# Patient Record
Sex: MALE | Race: WHITE | NOT HISPANIC OR LATINO
[De-identification: names, ages, dates, MRNs, and addresses within clinical notes are randomized per-mention and may not be internally consistent; named-entity substitution may affect disease eponyms.]

---

## 2017-01-30 ENCOUNTER — APPOINTMENT (OUTPATIENT)
Dept: UROLOGY | Facility: CLINIC | Age: 62
End: 2017-01-30

## 2017-01-30 VITALS — HEART RATE: 78 BPM | DIASTOLIC BLOOD PRESSURE: 80 MMHG | OXYGEN SATURATION: 98 % | SYSTOLIC BLOOD PRESSURE: 138 MMHG

## 2017-02-01 LAB
APPEARANCE: CLEAR
BACTERIA: NEGATIVE
BILIRUBIN URINE: NEGATIVE
BLOOD URINE: NEGATIVE
COLOR: ABNORMAL
GLUCOSE QUALITATIVE U: NORMAL MG/DL
HYALINE CASTS: 2 /LPF
KETONES URINE: NEGATIVE
LEUKOCYTE ESTERASE URINE: NEGATIVE
MICROSCOPIC-UA: NORMAL
NITRITE URINE: NEGATIVE
PH URINE: 5.5
PROTEIN URINE: ABNORMAL MG/DL
RED BLOOD CELLS URINE: 5 /HPF
SPECIFIC GRAVITY URINE: 1.02
SQUAMOUS EPITHELIAL CELLS: 0 /HPF
UROBILINOGEN URINE: NORMAL MG/DL
WHITE BLOOD CELLS URINE: 3 /HPF

## 2020-02-17 ENCOUNTER — EMERGENCY (EMERGENCY)
Facility: HOSPITAL | Age: 65
LOS: 1 days | Discharge: HOME | End: 2020-02-17
Attending: EMERGENCY MEDICINE
Payer: COMMERCIAL

## 2020-02-17 VITALS
DIASTOLIC BLOOD PRESSURE: 91 MMHG | WEIGHT: 214.95 LBS | RESPIRATION RATE: 18 BRPM | HEART RATE: 100 BPM | OXYGEN SATURATION: 98 % | HEIGHT: 73 IN | SYSTOLIC BLOOD PRESSURE: 125 MMHG | TEMPERATURE: 99 F

## 2020-02-17 VITALS
RESPIRATION RATE: 16 BRPM | HEART RATE: 98 BPM | SYSTOLIC BLOOD PRESSURE: 137 MMHG | OXYGEN SATURATION: 100 % | DIASTOLIC BLOOD PRESSURE: 80 MMHG | TEMPERATURE: 98 F

## 2020-02-17 DIAGNOSIS — Z90.49 ACQUIRED ABSENCE OF OTHER SPECIFIED PARTS OF DIGESTIVE TRACT: Chronic | ICD-10-CM

## 2020-02-17 DIAGNOSIS — Z98.890 OTHER SPECIFIED POSTPROCEDURAL STATES: Chronic | ICD-10-CM

## 2020-02-17 LAB
ALBUMIN SERPL ELPH-MCNC: 4.2 G/DL — SIGNIFICANT CHANGE UP (ref 3.3–5)
ALP SERPL-CCNC: 84 U/L — SIGNIFICANT CHANGE UP (ref 40–120)
ALT FLD-CCNC: 15 U/L — SIGNIFICANT CHANGE UP (ref 10–45)
ANION GAP SERPL CALC-SCNC: 14 MMOL/L — SIGNIFICANT CHANGE UP (ref 5–17)
APPEARANCE UR: CLEAR — SIGNIFICANT CHANGE UP
AST SERPL-CCNC: 27 U/L — SIGNIFICANT CHANGE UP (ref 10–40)
BACTERIA # UR AUTO: NEGATIVE — SIGNIFICANT CHANGE UP
BILIRUB SERPL-MCNC: 1.6 MG/DL — HIGH (ref 0.2–1.2)
BILIRUB UR-MCNC: NEGATIVE — SIGNIFICANT CHANGE UP
BUN SERPL-MCNC: 11 MG/DL — SIGNIFICANT CHANGE UP (ref 7–23)
CALCIUM SERPL-MCNC: 9.6 MG/DL — SIGNIFICANT CHANGE UP (ref 8.4–10.5)
CHLORIDE SERPL-SCNC: 98 MMOL/L — SIGNIFICANT CHANGE UP (ref 96–108)
CO2 SERPL-SCNC: 24 MMOL/L — SIGNIFICANT CHANGE UP (ref 22–31)
COLOR SPEC: YELLOW — SIGNIFICANT CHANGE UP
CREAT SERPL-MCNC: 1.29 MG/DL — SIGNIFICANT CHANGE UP (ref 0.5–1.3)
DIFF PNL FLD: NEGATIVE — SIGNIFICANT CHANGE UP
EPI CELLS # UR: 0 /HPF — SIGNIFICANT CHANGE UP
GLUCOSE SERPL-MCNC: 115 MG/DL — HIGH (ref 70–99)
GLUCOSE UR QL: NEGATIVE — SIGNIFICANT CHANGE UP
HCT VFR BLD CALC: 48.1 % — SIGNIFICANT CHANGE UP (ref 39–50)
HGB BLD-MCNC: 16 G/DL — SIGNIFICANT CHANGE UP (ref 13–17)
HYALINE CASTS # UR AUTO: 1 /LPF — SIGNIFICANT CHANGE UP (ref 0–2)
KETONES UR-MCNC: NEGATIVE — SIGNIFICANT CHANGE UP
LEUKOCYTE ESTERASE UR-ACNC: NEGATIVE — SIGNIFICANT CHANGE UP
LIDOCAIN IGE QN: 26 U/L — SIGNIFICANT CHANGE UP (ref 7–60)
MCHC RBC-ENTMCNC: 31.4 PG — SIGNIFICANT CHANGE UP (ref 27–34)
MCHC RBC-ENTMCNC: 33.3 GM/DL — SIGNIFICANT CHANGE UP (ref 32–36)
MCV RBC AUTO: 94.5 FL — SIGNIFICANT CHANGE UP (ref 80–100)
NITRITE UR-MCNC: NEGATIVE — SIGNIFICANT CHANGE UP
NRBC # BLD: 0 /100 WBCS — SIGNIFICANT CHANGE UP (ref 0–0)
PH UR: 6 — SIGNIFICANT CHANGE UP (ref 5–8)
PLATELET # BLD AUTO: 176 K/UL — SIGNIFICANT CHANGE UP (ref 150–400)
POTASSIUM SERPL-MCNC: 3.5 MMOL/L — SIGNIFICANT CHANGE UP (ref 3.5–5.3)
POTASSIUM SERPL-SCNC: 3.5 MMOL/L — SIGNIFICANT CHANGE UP (ref 3.5–5.3)
PROT SERPL-MCNC: 7.8 G/DL — SIGNIFICANT CHANGE UP (ref 6–8.3)
PROT UR-MCNC: ABNORMAL
RBC # BLD: 5.09 M/UL — SIGNIFICANT CHANGE UP (ref 4.2–5.8)
RBC # FLD: 13 % — SIGNIFICANT CHANGE UP (ref 10.3–14.5)
RBC CASTS # UR COMP ASSIST: 3 /HPF — SIGNIFICANT CHANGE UP (ref 0–4)
SODIUM SERPL-SCNC: 136 MMOL/L — SIGNIFICANT CHANGE UP (ref 135–145)
SP GR SPEC: 1.02 — SIGNIFICANT CHANGE UP (ref 1.01–1.02)
UROBILINOGEN FLD QL: NEGATIVE — SIGNIFICANT CHANGE UP
WBC # BLD: 11.96 K/UL — HIGH (ref 3.8–10.5)
WBC # FLD AUTO: 11.96 K/UL — HIGH (ref 3.8–10.5)
WBC UR QL: 2 /HPF — SIGNIFICANT CHANGE UP (ref 0–5)

## 2020-02-17 PROCEDURE — 96375 TX/PRO/DX INJ NEW DRUG ADDON: CPT | Mod: XU

## 2020-02-17 PROCEDURE — 76770 US EXAM ABDO BACK WALL COMP: CPT

## 2020-02-17 PROCEDURE — 99284 EMERGENCY DEPT VISIT MOD MDM: CPT | Mod: 25

## 2020-02-17 PROCEDURE — 85027 COMPLETE CBC AUTOMATED: CPT

## 2020-02-17 PROCEDURE — 99284 EMERGENCY DEPT VISIT MOD MDM: CPT | Mod: GC

## 2020-02-17 PROCEDURE — 80053 COMPREHEN METABOLIC PANEL: CPT

## 2020-02-17 PROCEDURE — 87086 URINE CULTURE/COLONY COUNT: CPT

## 2020-02-17 PROCEDURE — 74177 CT ABD & PELVIS W/CONTRAST: CPT

## 2020-02-17 PROCEDURE — 96374 THER/PROPH/DIAG INJ IV PUSH: CPT | Mod: XU

## 2020-02-17 PROCEDURE — 83690 ASSAY OF LIPASE: CPT

## 2020-02-17 PROCEDURE — 74177 CT ABD & PELVIS W/CONTRAST: CPT | Mod: 26

## 2020-02-17 PROCEDURE — 81001 URINALYSIS AUTO W/SCOPE: CPT

## 2020-02-17 PROCEDURE — 76770 US EXAM ABDO BACK WALL COMP: CPT | Mod: 26

## 2020-02-17 RX ORDER — HYDROCORTISONE 20 MG
200 TABLET ORAL ONCE
Refills: 0 | Status: COMPLETED | OUTPATIENT
Start: 2020-02-17 | End: 2020-02-17

## 2020-02-17 RX ORDER — DIPHENHYDRAMINE HCL 50 MG
50 CAPSULE ORAL ONCE
Refills: 0 | Status: COMPLETED | OUTPATIENT
Start: 2020-02-17 | End: 2020-02-17

## 2020-02-17 RX ADMIN — Medication 50 MILLIGRAM(S): at 15:12

## 2020-02-17 RX ADMIN — Medication 1 TABLET(S): at 19:02

## 2020-02-17 RX ADMIN — Medication 200 MILLIGRAM(S): at 12:22

## 2020-02-17 NOTE — ED PROVIDER NOTE - CLINICAL SUMMARY MEDICAL DECISION MAKING FREE TEXT BOX
65 y/o M presenting with left inguinal/groin pain. Differential broad, considering likely MSK for nephrolithiasis. Lower concern for incarcerated hernia, diverticulitis. Will obtain basic labs, UA/UC. 65 y/o M presenting with left inguinal/groin pain. Differential broad, considering likely MSK for nephrolithiasis. Lower concern for incarcerated hernia, diverticulitis. Will obtain basic labs, UA/UC.      Attending note. Left lower quadrant pain which is intermittent for the last 2 days. Patient has no constitutional symptoms were  symptoms or GI symptoms. Basic labs, urinalysis and reassess. Ultrasound to rule out hydronephrosis as patient has a history of renal stones, although this does not feel like that according to the patient.

## 2020-02-17 NOTE — ED ADULT NURSE NOTE - OBJECTIVE STATEMENT
65 yo presents to the ED From home. A&OX4, ambulatory c/o LLQ pain. starting last night, shooting pain, intermittent, 4/10. did not take anything at home for pain relief. reports decreased appetite. denies n/v/d. denies fever, chills. denies urinary symptoms. history of kidney stone last year, on the left side. reports that this pain does not feel similar. abd soft nondistended nontender. no medical history. pt is well appearing. VSS. 20G inserted in RAC.

## 2020-02-17 NOTE — ED PROVIDER NOTE - PHYSICAL EXAMINATION
gen: well appearing  Mentation: AAO x 3  psych: mood appropriate  ENT: airway patent  Eyes: conjunctivae clear bilaterally  Cardio: RRR, no m/r/g  Resp: normal BS b/l  GI: LLQ TTP, no hernia appreciated; soft, nondistended  : no hernia appreciated, no CVA tenderness  Neuro: sensation and motor function intact  Skin: No evidence of rash  MSK: normal movement of all extremities  Lymph/Vasc: no LE edema gen: well appearing  Mentation: AAO x 3  psych: mood appropriate  ENT: airway patent  Eyes: conjunctivae clear bilaterally  Cardio: RRR, no m/r/g  Resp: normal BS b/l  GI: LLQ TTP, no hernia appreciated; soft, nondistended  : no hernia appreciated, no CVA tenderness  Neuro: sensation and motor function intact  Skin: No evidence of rash  MSK: normal movement of all extremities  Lymph/Vasc: no LE edema     Attending note. Patient is alert and in no acute distress. Patient currently has no pain unless he presses the left lower quadrant. There is no pallor jaundice. Lungs are clear and equal bilaterally. Heart is regular rate and rhythm. Abdomen is soft with mild tenderness in the left lower quadrant and flank. There is no CVA tenderness. There is no palpable hernia. Scrotum and testicles are normal. Skin is normal. There are no rashes or lesions on the abdomen or flank. Extremities are normal.  Her large examination is grossly intact.

## 2020-02-17 NOTE — ED ADULT NURSE REASSESSMENT NOTE - NS ED NURSE REASSESS COMMENT FT1
Pt AAOx4, NAD, resting comfortably in bed. Pt tolerated PO challenge. Pt denies headache, dizziness, chest pain, cough, SOB, abdominal pain, n/v/d, urinary symptoms, fevers, chills, weakness at this time. Pt discharged as per MD, IV removed as per MD, pt ambulated independently out of ED, steady gait noted.

## 2020-02-17 NOTE — ED PROCEDURE NOTE - PROCEDURE ADDITIONAL DETAILS
POCUS: Emergency Department Focused Ultrasound performed at patient's bedside.  The complete report may be available in PACS, see below for findings. POCUS: Emergency Department Focused Ultrasound performed at patient's bedside.  The complete report may be available in PACS, see below for findings.  Patient and treating team, Dr. Villanueva/Myesha informed of the finding of a complex renal cyst in the right kidney and of the need to Follow up with his primary medical doctor and urologist on the monitoring and maintenance of this finding.  The patient and/or family were informed of the result of this test and was encouraged to follow up on the findings with their doctor (as well as the need to inform their doctor of the results). The patient is aware of the need to follow up with repeat testing as applicable and reports understanding of the above with capacity and insight. POCUS: Emergency Department Focused Ultrasound performed at patient's bedside.  The complete report may be available in PACS, see below for findings.  Patient and treating team, Dr. Villanueva/Gustavo informed of the finding of a complex renal cyst in the right kidney and of the need to Follow up with his primary medical doctor and urologist on the monitoring and maintenance of this finding.  The patient and/or family were informed of the result of this test and was encouraged to follow up on the findings with their doctor (as well as the need to inform their doctor of the results). The patient is aware of the need to follow up with repeat testing as applicable and reports understanding of the above with capacity and insight.

## 2020-02-17 NOTE — ED PROVIDER NOTE - NSFOLLOWUPINSTRUCTIONS_ED_ALL_ED_FT
Diverticulitis    Diverticulitis is inflammation or infection of small pouches in your colon that form when you HAVE a condition called diverticulosis. This condition can range from mild to severe potentially leading to perforation or obstructions of your colon. Symptoms include abdominal pain, fever/chills, nausea, vomiting, diarrhea, constipation, or blood in your stool. If you were prescribed an antibiotic medicine, take it as told by your health care provider. Do not stop taking the antibiotic even if you start to feel better.    SEEK IMMEDIATE MEDICAL CARE IF YOU HAVE ANY OF THE FOLLOWING SYMPTOMS: worsening abdominal pain, high fever, inability to hold down liquids or medication, black or bloody stools, inability to pass gas, lightheadedness/dizziness, or a change in mental status.    PLEASE FOLLOW UP WITH YOUR PRIMARY CARE DOCTOR AND GASTROENTEROLOGY. SOMEONE FROM OUR ED REFERRAL TEAM WILL CONTACT YOU TO MAKE AN APPOINTMENT WITH GASTROENTEROLOGY.

## 2020-02-17 NOTE — ED PROVIDER NOTE - NS ED ROS FT
CONSTITUTIONAL: No fevers, no chills, no lightheadedness, no dizziness  Eyes: no visual changes  Ears: no ear drainage, no ear pain  Nose: no nasal congestion  Mouth/Throat: no sore throat  CV: No chest pain, no palpitations  PULM: No SOB, no cough  GI: No n/v/d, no abd pain  : no dysuria, no hematuria  SKIN: no rashes.  NEURO: no headache, no focal weakness or numbness  LYMPH/VASC: no LE swelling

## 2020-02-17 NOTE — ED PROVIDER NOTE - PATIENT PORTAL LINK FT
You can access the FollowMyHealth Patient Portal offered by [facility] by registering at the following website: [email or web address]. By joining [facility]’s FollowMyHealth portal, you will also be able to view your health information using other applications (apps) compatible with our system.

## 2020-02-17 NOTE — ED PROVIDER NOTE - OBJECTIVE STATEMENT
63 y/o M with PMH of kidney stones presenting with 2 days of left groin pain. Patient states he noticed pain after getting up from a chair and walking. Patient states pain only comes on if he touches the area or moves in a certain way. Denies urinary complains, fevers, cp, sob, n/v/d. States pain is different from kidney stone in past. 65 y/o M with PMH of kidney stones presenting with 2 days of left groin pain. Patient states he noticed pain after getting up from a chair and walking. Patient states pain only comes on if he touches the area or moves in a certain way. Denies urinary complains, fevers, cp, sob, n/v/d. States pain is different from kidney stone in past.       Attending note. Patient was seen in room #30. Agree with the above. Patient is 2 days of intermittent left lower quadrant and flank pain. He denies any fevers, chills or sweats. He has no nausea or vomiting. He denies any hematuria, or urgency or frequency. Denies any pain radiating to the penis were testicles. He has a history of renal stone in December of 2016. He reports this pain feels different. He has no change in his bowels. Pain is only fair when he presses the area.

## 2020-02-18 LAB
CULTURE RESULTS: SIGNIFICANT CHANGE UP
SPECIMEN SOURCE: SIGNIFICANT CHANGE UP

## 2022-12-31 ENCOUNTER — NON-APPOINTMENT (OUTPATIENT)
Age: 67
End: 2022-12-31

## 2023-01-05 ENCOUNTER — INPATIENT (INPATIENT)
Facility: HOSPITAL | Age: 68
LOS: 2 days | Discharge: LEFT BEFORE DISCHARGE | End: 2023-01-08
Attending: INTERNAL MEDICINE | Admitting: INTERNAL MEDICINE
Payer: COMMERCIAL

## 2023-01-05 VITALS
SYSTOLIC BLOOD PRESSURE: 135 MMHG | HEART RATE: 67 BPM | OXYGEN SATURATION: 100 % | RESPIRATION RATE: 14 BRPM | DIASTOLIC BLOOD PRESSURE: 76 MMHG | TEMPERATURE: 98 F

## 2023-01-05 DIAGNOSIS — Z90.49 ACQUIRED ABSENCE OF OTHER SPECIFIED PARTS OF DIGESTIVE TRACT: Chronic | ICD-10-CM

## 2023-01-05 DIAGNOSIS — Z98.890 OTHER SPECIFIED POSTPROCEDURAL STATES: Chronic | ICD-10-CM

## 2023-01-05 DIAGNOSIS — R07.9 CHEST PAIN, UNSPECIFIED: ICD-10-CM

## 2023-01-05 PROBLEM — Z78.9 OTHER SPECIFIED HEALTH STATUS: Chronic | Status: ACTIVE | Noted: 2020-02-17

## 2023-01-05 LAB
ALBUMIN SERPL ELPH-MCNC: 4.6 G/DL — SIGNIFICANT CHANGE UP (ref 3.3–5)
ALP SERPL-CCNC: 88 U/L — SIGNIFICANT CHANGE UP (ref 40–120)
ALT FLD-CCNC: 26 U/L — SIGNIFICANT CHANGE UP (ref 4–41)
ANION GAP SERPL CALC-SCNC: 17 MMOL/L — HIGH (ref 7–14)
ANION GAP SERPL CALC-SCNC: 9 MMOL/L — SIGNIFICANT CHANGE UP (ref 7–14)
APTT BLD: 30.7 SEC — SIGNIFICANT CHANGE UP (ref 27–36.3)
AST SERPL-CCNC: 31 U/L — SIGNIFICANT CHANGE UP (ref 4–40)
B PERT DNA SPEC QL NAA+PROBE: SIGNIFICANT CHANGE UP
B PERT+PARAPERT DNA PNL SPEC NAA+PROBE: SIGNIFICANT CHANGE UP
BASE EXCESS BLDV CALC-SCNC: -0.5 MMOL/L — SIGNIFICANT CHANGE UP (ref -2–3)
BASE EXCESS BLDV CALC-SCNC: 5.5 MMOL/L — HIGH (ref -2–3)
BASOPHILS # BLD AUTO: 0.02 K/UL — SIGNIFICANT CHANGE UP (ref 0–0.2)
BASOPHILS NFR BLD AUTO: 0.3 % — SIGNIFICANT CHANGE UP (ref 0–2)
BILIRUB SERPL-MCNC: 1.1 MG/DL — SIGNIFICANT CHANGE UP (ref 0.2–1.2)
BLOOD GAS VENOUS COMPREHENSIVE RESULT: SIGNIFICANT CHANGE UP
BORDETELLA PARAPERTUSSIS (RAPRVP): SIGNIFICANT CHANGE UP
BUN SERPL-MCNC: 10 MG/DL — SIGNIFICANT CHANGE UP (ref 7–23)
BUN SERPL-MCNC: 11 MG/DL — SIGNIFICANT CHANGE UP (ref 7–23)
C PNEUM DNA SPEC QL NAA+PROBE: SIGNIFICANT CHANGE UP
CALCIUM SERPL-MCNC: 10.7 MG/DL — HIGH (ref 8.4–10.5)
CALCIUM SERPL-MCNC: 9.3 MG/DL — SIGNIFICANT CHANGE UP (ref 8.4–10.5)
CHLORIDE BLDV-SCNC: 104 MMOL/L — SIGNIFICANT CHANGE UP (ref 96–108)
CHLORIDE BLDV-SCNC: 105 MMOL/L — SIGNIFICANT CHANGE UP (ref 96–108)
CHLORIDE BLDV-SCNC: 106 MMOL/L — SIGNIFICANT CHANGE UP (ref 96–108)
CHLORIDE BLDV-SCNC: 106 MMOL/L — SIGNIFICANT CHANGE UP (ref 96–108)
CHLORIDE SERPL-SCNC: 104 MMOL/L — SIGNIFICANT CHANGE UP (ref 98–107)
CHLORIDE SERPL-SCNC: 106 MMOL/L — SIGNIFICANT CHANGE UP (ref 98–107)
CO2 BLDV-SCNC: 31.1 MMOL/L — HIGH (ref 22–26)
CO2 BLDV-SCNC: 31.2 MMOL/L — HIGH (ref 22–26)
CO2 BLDV-SCNC: 32 MMOL/L — HIGH (ref 22–26)
CO2 BLDV-SCNC: 32 MMOL/L — HIGH (ref 22–26)
CO2 SERPL-SCNC: 27 MMOL/L — SIGNIFICANT CHANGE UP (ref 22–31)
CO2 SERPL-SCNC: 28 MMOL/L — SIGNIFICANT CHANGE UP (ref 22–31)
CREAT SERPL-MCNC: 1.15 MG/DL — SIGNIFICANT CHANGE UP (ref 0.5–1.3)
CREAT SERPL-MCNC: 1.15 MG/DL — SIGNIFICANT CHANGE UP (ref 0.5–1.3)
EGFR: 69 ML/MIN/1.73M2 — SIGNIFICANT CHANGE UP
EGFR: 69 ML/MIN/1.73M2 — SIGNIFICANT CHANGE UP
EOSINOPHIL # BLD AUTO: 0.24 K/UL — SIGNIFICANT CHANGE UP (ref 0–0.5)
EOSINOPHIL NFR BLD AUTO: 3.5 % — SIGNIFICANT CHANGE UP (ref 0–6)
FLUAV SUBTYP SPEC NAA+PROBE: SIGNIFICANT CHANGE UP
FLUBV RNA SPEC QL NAA+PROBE: SIGNIFICANT CHANGE UP
GAS PNL BLDV: 137 MMOL/L — SIGNIFICANT CHANGE UP (ref 136–145)
GAS PNL BLDV: 139 MMOL/L — SIGNIFICANT CHANGE UP (ref 136–145)
GAS PNL BLDV: 139 MMOL/L — SIGNIFICANT CHANGE UP (ref 136–145)
GAS PNL BLDV: 142 MMOL/L — SIGNIFICANT CHANGE UP (ref 136–145)
GLUCOSE BLDV-MCNC: 87 MG/DL — SIGNIFICANT CHANGE UP (ref 70–99)
GLUCOSE BLDV-MCNC: 92 MG/DL — SIGNIFICANT CHANGE UP (ref 70–99)
GLUCOSE BLDV-MCNC: 93 MG/DL — SIGNIFICANT CHANGE UP (ref 70–99)
GLUCOSE BLDV-MCNC: 94 MG/DL — SIGNIFICANT CHANGE UP (ref 70–99)
GLUCOSE SERPL-MCNC: 102 MG/DL — HIGH (ref 70–99)
GLUCOSE SERPL-MCNC: 96 MG/DL — SIGNIFICANT CHANGE UP (ref 70–99)
HADV DNA SPEC QL NAA+PROBE: SIGNIFICANT CHANGE UP
HCO3 BLDV-SCNC: 29 MMOL/L — SIGNIFICANT CHANGE UP (ref 22–29)
HCO3 BLDV-SCNC: 30 MMOL/L — HIGH (ref 22–29)
HCO3 BLDV-SCNC: 31 MMOL/L — HIGH (ref 22–29)
HCO3 BLDV-SCNC: 31 MMOL/L — HIGH (ref 22–29)
HCOV 229E RNA SPEC QL NAA+PROBE: SIGNIFICANT CHANGE UP
HCOV HKU1 RNA SPEC QL NAA+PROBE: SIGNIFICANT CHANGE UP
HCOV NL63 RNA SPEC QL NAA+PROBE: SIGNIFICANT CHANGE UP
HCOV OC43 RNA SPEC QL NAA+PROBE: SIGNIFICANT CHANGE UP
HCT VFR BLD CALC: 49.4 % — SIGNIFICANT CHANGE UP (ref 39–50)
HCT VFR BLDA CALC: 45 % — SIGNIFICANT CHANGE UP (ref 39–51)
HCT VFR BLDA CALC: 47 % — SIGNIFICANT CHANGE UP (ref 39–51)
HCT VFR BLDA CALC: 47 % — SIGNIFICANT CHANGE UP (ref 39–51)
HCT VFR BLDA CALC: 49 % — SIGNIFICANT CHANGE UP (ref 39–51)
HGB BLD CALC-MCNC: 14.9 G/DL — SIGNIFICANT CHANGE UP (ref 13–17)
HGB BLD CALC-MCNC: 15.5 G/DL — SIGNIFICANT CHANGE UP (ref 13–17)
HGB BLD CALC-MCNC: 15.6 G/DL — SIGNIFICANT CHANGE UP (ref 13–17)
HGB BLD CALC-MCNC: 16.3 G/DL — SIGNIFICANT CHANGE UP (ref 13–17)
HGB BLD-MCNC: 15.9 G/DL — SIGNIFICANT CHANGE UP (ref 13–17)
HMPV RNA SPEC QL NAA+PROBE: SIGNIFICANT CHANGE UP
HPIV1 RNA SPEC QL NAA+PROBE: SIGNIFICANT CHANGE UP
HPIV2 RNA SPEC QL NAA+PROBE: SIGNIFICANT CHANGE UP
HPIV3 RNA SPEC QL NAA+PROBE: SIGNIFICANT CHANGE UP
HPIV4 RNA SPEC QL NAA+PROBE: SIGNIFICANT CHANGE UP
IANC: 3.9 K/UL — SIGNIFICANT CHANGE UP (ref 1.8–7.4)
IMM GRANULOCYTES NFR BLD AUTO: 0.1 % — SIGNIFICANT CHANGE UP (ref 0–0.9)
INR BLD: 1.04 RATIO — SIGNIFICANT CHANGE UP (ref 0.88–1.16)
LACTATE BLDV-MCNC: 0.8 MMOL/L — SIGNIFICANT CHANGE UP (ref 0.5–2)
LACTATE BLDV-MCNC: 0.9 MMOL/L — SIGNIFICANT CHANGE UP (ref 0.5–2)
LACTATE BLDV-MCNC: 1.1 MMOL/L — SIGNIFICANT CHANGE UP (ref 0.5–2)
LACTATE BLDV-MCNC: 7.1 MMOL/L — CRITICAL HIGH (ref 0.5–2)
LIDOCAIN IGE QN: 38 U/L — SIGNIFICANT CHANGE UP (ref 7–60)
LYMPHOCYTES # BLD AUTO: 1.93 K/UL — SIGNIFICANT CHANGE UP (ref 1–3.3)
LYMPHOCYTES # BLD AUTO: 28.1 % — SIGNIFICANT CHANGE UP (ref 13–44)
M PNEUMO DNA SPEC QL NAA+PROBE: SIGNIFICANT CHANGE UP
MAGNESIUM SERPL-MCNC: 2.1 MG/DL — SIGNIFICANT CHANGE UP (ref 1.6–2.6)
MAGNESIUM SERPL-MCNC: 2.3 MG/DL — SIGNIFICANT CHANGE UP (ref 1.6–2.6)
MCHC RBC-ENTMCNC: 30.8 PG — SIGNIFICANT CHANGE UP (ref 27–34)
MCHC RBC-ENTMCNC: 32.2 GM/DL — SIGNIFICANT CHANGE UP (ref 32–36)
MCV RBC AUTO: 95.6 FL — SIGNIFICANT CHANGE UP (ref 80–100)
MONOCYTES # BLD AUTO: 0.76 K/UL — SIGNIFICANT CHANGE UP (ref 0–0.9)
MONOCYTES NFR BLD AUTO: 11.1 % — SIGNIFICANT CHANGE UP (ref 2–14)
NEUTROPHILS # BLD AUTO: 3.9 K/UL — SIGNIFICANT CHANGE UP (ref 1.8–7.4)
NEUTROPHILS NFR BLD AUTO: 56.9 % — SIGNIFICANT CHANGE UP (ref 43–77)
NRBC # BLD: 0 /100 WBCS — SIGNIFICANT CHANGE UP (ref 0–0)
NRBC # FLD: 0 K/UL — SIGNIFICANT CHANGE UP (ref 0–0)
NT-PROBNP SERPL-SCNC: 44 PG/ML — SIGNIFICANT CHANGE UP
PCO2 BLDV: 41 MMHG — LOW (ref 42–55)
PCO2 BLDV: 45 MMHG — SIGNIFICANT CHANGE UP (ref 42–55)
PCO2 BLDV: 45 MMHG — SIGNIFICANT CHANGE UP (ref 42–55)
PCO2 BLDV: 68 MMHG — HIGH (ref 42–55)
PH BLDV: 7.24 — LOW (ref 7.32–7.43)
PH BLDV: 7.44 — HIGH (ref 7.32–7.43)
PH BLDV: 7.44 — HIGH (ref 7.32–7.43)
PH BLDV: 7.47 — HIGH (ref 7.32–7.43)
PHOSPHATE SERPL-MCNC: 2.6 MG/DL — SIGNIFICANT CHANGE UP (ref 2.5–4.5)
PLATELET # BLD AUTO: 237 K/UL — SIGNIFICANT CHANGE UP (ref 150–400)
PO2 BLDV: 27 MMHG — SIGNIFICANT CHANGE UP
PO2 BLDV: 29 MMHG — SIGNIFICANT CHANGE UP
PO2 BLDV: 30 MMHG — SIGNIFICANT CHANGE UP
PO2 BLDV: 31 MMHG — SIGNIFICANT CHANGE UP
POTASSIUM BLDV-SCNC: 3.3 MMOL/L — LOW (ref 3.5–5.1)
POTASSIUM BLDV-SCNC: 3.4 MMOL/L — LOW (ref 3.5–5.1)
POTASSIUM BLDV-SCNC: 3.6 MMOL/L — SIGNIFICANT CHANGE UP (ref 3.5–5.1)
POTASSIUM BLDV-SCNC: 3.7 MMOL/L — SIGNIFICANT CHANGE UP (ref 3.5–5.1)
POTASSIUM SERPL-MCNC: 3.7 MMOL/L — SIGNIFICANT CHANGE UP (ref 3.5–5.3)
POTASSIUM SERPL-MCNC: 3.9 MMOL/L — SIGNIFICANT CHANGE UP (ref 3.5–5.3)
POTASSIUM SERPL-SCNC: 3.7 MMOL/L — SIGNIFICANT CHANGE UP (ref 3.5–5.3)
POTASSIUM SERPL-SCNC: 3.9 MMOL/L — SIGNIFICANT CHANGE UP (ref 3.5–5.3)
PROT SERPL-MCNC: 8 G/DL — SIGNIFICANT CHANGE UP (ref 6–8.3)
PROTHROM AB SERPL-ACNC: 12.1 SEC — SIGNIFICANT CHANGE UP (ref 10.5–13.4)
RAPID RVP RESULT: SIGNIFICANT CHANGE UP
RBC # BLD: 5.17 M/UL — SIGNIFICANT CHANGE UP (ref 4.2–5.8)
RBC # FLD: 13.1 % — SIGNIFICANT CHANGE UP (ref 10.3–14.5)
RSV RNA SPEC QL NAA+PROBE: SIGNIFICANT CHANGE UP
RV+EV RNA SPEC QL NAA+PROBE: SIGNIFICANT CHANGE UP
SAO2 % BLDV: 37.1 % — SIGNIFICANT CHANGE UP
SAO2 % BLDV: 44.6 % — SIGNIFICANT CHANGE UP
SAO2 % BLDV: 47.3 % — SIGNIFICANT CHANGE UP
SAO2 % BLDV: 51.3 % — SIGNIFICANT CHANGE UP
SARS-COV-2 RNA SPEC QL NAA+PROBE: SIGNIFICANT CHANGE UP
SODIUM SERPL-SCNC: 143 MMOL/L — SIGNIFICANT CHANGE UP (ref 135–145)
SODIUM SERPL-SCNC: 148 MMOL/L — HIGH (ref 135–145)
TROPONIN T, HIGH SENSITIVITY RESULT: 8 NG/L — SIGNIFICANT CHANGE UP
TROPONIN T, HIGH SENSITIVITY RESULT: 8 NG/L — SIGNIFICANT CHANGE UP
WBC # BLD: 6.86 K/UL — SIGNIFICANT CHANGE UP (ref 3.8–10.5)
WBC # FLD AUTO: 6.86 K/UL — SIGNIFICANT CHANGE UP (ref 3.8–10.5)

## 2023-01-05 PROCEDURE — 99285 EMERGENCY DEPT VISIT HI MDM: CPT | Mod: GC

## 2023-01-05 PROCEDURE — 99223 1ST HOSP IP/OBS HIGH 75: CPT

## 2023-01-05 PROCEDURE — 71045 X-RAY EXAM CHEST 1 VIEW: CPT | Mod: 26

## 2023-01-05 RX ORDER — SODIUM CHLORIDE 9 MG/ML
1000 INJECTION INTRAMUSCULAR; INTRAVENOUS; SUBCUTANEOUS ONCE
Refills: 0 | Status: DISCONTINUED | OUTPATIENT
Start: 2023-01-05 | End: 2023-01-05

## 2023-01-05 RX ORDER — SODIUM CHLORIDE 9 MG/ML
1000 INJECTION, SOLUTION INTRAVENOUS ONCE
Refills: 0 | Status: COMPLETED | OUTPATIENT
Start: 2023-01-05 | End: 2023-01-05

## 2023-01-05 RX ADMIN — SODIUM CHLORIDE 1000 MILLILITER(S): 9 INJECTION, SOLUTION INTRAVENOUS at 15:17

## 2023-01-05 NOTE — H&P ADULT - HISTORY OF PRESENT ILLNESS
68 yo man, former heavy smoker, with history of cholecystectomy and inguinal hernia repair presents with b/l chest pain and abdominal bloating for past 2 weeks. Pt states that just before [place], he started having a frequent productive cough, bringing up lots of white sputum. Soon after onset of the cough, pt began having b/l chest pain, localized to his lower anterior ribs. Pt describes the chest pain as a squeezing sensation, feeling like someone is  66 yo man, former heavy smoker, with history of cholecystectomy and inguinal hernia repair presents with b/l chest pain, decreased exercise tolerance, poor appetite/PO intake, and abdominal bloating for past 2 weeks. Pt states that just before [place], he started having a frequent productive cough, bringing up lots of white sputum. Soon after onset of the cough, pt began having b/l chest pain, localized to his lower anterior ribs. Pt describes the chest pain as a squeezing sensation, feeling like someone is pushing his rib cage in on both sides. Pt also notes that due to the copious amount of phlegm he was having, he lost his appetite, forgoing most meals over the 2-week period. Pt has lost about 10 lbs 68 yo man, former heavy smoker, with history of cholecystectomy and inguinal hernia repair presents with b/l chest pain, decreased exercise tolerance, poor appetite/PO intake, and abdominal bloating for past 2 weeks. Pt states that just before [place], he started having a frequent productive cough, bringing up lots of white sputum. Soon after onset of the cough, pt began having b/l chest pain, localized to his lower anterior ribs. Pt describes the chest pain as a squeezing sensation, feeling like someone is pushing his rib cage in on both sides. Pt also notes that due to the copious amount of phlegm he was having, he lost his appetite, forgoing most meals over the 2-week period. Pt has lost about 10 lbs during this time.  68 yo man, former heavy smoker, with history of cholecystectomy and inguinal hernia repair presents with b/l chest pain, decreased exercise tolerance, poor appetite/PO intake, and abdominal bloating for past 2 weeks. Pt states that just before [place], he started having a frequent productive cough, bringing up lots of white sputum. Soon after onset of the cough, pt began having b/l chest pain, localized to his lower anterior ribs. Pt describes the chest pain as a squeezing sensation, feeling like someone is pushing his rib cage in on both sides. Prior to 2 weeks ago, pt was walking 2 miles every morning without any issues, but over the past 2 weeks, pt has only been able to take his daily 2-mile walk twice, both times Pt also notes that due to the copious amount of phlegm he was having, he lost his appetite, forgoing most meals over the 2-week period. Pt has lost about 10 lbs during this time.  68 yo man, former heavy smoker, with history of cholecystectomy and inguinal hernia repair presents with b/l chest pain, decreased exercise tolerance, poor appetite/PO intake, and abdominal bloating for past 2 weeks. Pt states that just before [place], he started having a frequent productive cough, bringing up lots of white sputum. Soon after onset of the cough, pt began having b/l chest pain, localized to his lower anterior ribs. Pt describes the chest pain as a tightness/squeezing sensation, feeling like someone is pushing his rib cage in on both sides. Prior to 2 weeks ago, pt was walking 2 miles every morning without any issues, but over the past 2 weeks, pt has been able to take his daily 2-mile walk only twice, with pt feeling severe b/l chest tightness during his walk and exhaustion after returning home. Pt would also become easily fatigued, even occasionally short of breath, with routine activity at home. Pt also notes that due to the copious amount of phlegm he was having with the cough, he lost his appetite, forgoing most meals over the 2-week period. Pt has lost about 10 lbs during this time. Pt went to urgent care this past Sunday to get evaluated for his symptoms and had a negative COVID-19 test and CXR, with no medications prescribed. Pt reports that while his cough and b/l chest pain have been gradually improving since his urgent care visit, pt decided to go to [facility] ED because of abdominal bloating  66 yo man, former heavy smoker, with history of cholecystectomy and inguinal hernia repair presents with b/l chest pain, decreased exercise tolerance, poor appetite/PO intake, and abdominal bloating for past 2 weeks. Pt states that just before [place], he started having a frequent productive cough, bringing up lots of white sputum. Soon after onset of the cough, pt began having b/l chest pain, localized to his lower anterior ribs. Pt describes the chest pain as a tightness/squeezing sensation, feeling like someone is pushing his rib cage in on both sides. Prior to 2 weeks ago, pt was walking 2 miles every morning without any issues, but over the past 2 weeks, pt has been able to take his daily 2-mile walk only twice, with pt feeling severe b/l chest tightness during his walk and exhaustion after returning home. Pt would also become easily fatigued, even occasionally short of breath, with routine activity at home. Pt also notes that due to the copious amount of phlegm he was having with the cough, he lost his appetite, forgoing most meals over the 2-week period. Pt has lost about 10 lbs during this time. Pt went to urgent care this past Sunday to get evaluated for his symptoms and had a negative COVID-19 test and CXR, with no medications prescribed. Pt reports that while his cough and b/l chest pain have been gradually improving since his urgent care visit, pt has been having abdominal bloating at night, causing him to not be able to sleep for more than a couple hours at a time. Pt denies any orthopnea, PND, LE pain/swelling, nausea, or vomiting. Pt also denies any recent fevers, chills, palpitations, lightheadedness, dizziness, headaches, back pain, diarrhea, constipation, melena, BRBPR, or urinary symptoms.     Of note, pt mentions that 5 family members and 2 good friends passed away since September, so he is now feeling anxious about finding out why he is having his current symptoms. Pt denies any depressive symptoms or SI/HI.     In the ED,  T 98-98.9, HR 66-67, -137/76-91, RR 14-20, SpO2 100% RA.  Hs-trop 8 -> 8, pro-BNP 44.

## 2023-01-05 NOTE — H&P ADULT - TIME BILLING
I had a face to face encounter with this patient. I spent 75 total minutes on chart review, bedside interview and physical exam, orders, interpretation of results, documentation, and coordination of care for this patient.

## 2023-01-05 NOTE — ED ADULT NURSE REASSESSMENT NOTE - NS ED NURSE REASSESS COMMENT FT1
Break RN note- Patient resting quietly in bed, breathing even and nonlabored. No acute distress. Patient denies any chest pain or SOB at this time. Safety maintained. Cardiac monitor in place- sinus rhythm. Patient stable upon exiting the room.

## 2023-01-05 NOTE — ED PROVIDER NOTE - ATTENDING CONTRIBUTION TO CARE
Dr. Hanson: This is a 67-year-old male with no known past medical history, in the emergency department with reduced exercise tolerance for approximately 2 weeks.  Patient states that he is used to walking over 2 miles a day without any issues, recently started to feel like walking 2 miles made him very winded, more recently he has been unable to complete his walk due to feeling so short of breath.  He also feels at night when he lays flat that his abdomen is "tight", but he feels no shortness of breath when laying flat.  He also endorses that he has been eating less due to decreased appetite, and he thinks that he has lost approximately 9 pounds over the last 10 days.  He denies chest pain, nausea, vomiting, diarrhea.  On exam pt well appearing, in NAD, heart RRR, lungs CTAB, abd NTND, extremities without swelling, strength 5/5 in all extremities and skin without rash.     My DDx includes CHF vs. ACS vs. viral syndrome vs. pneumonia.    I reviewed external notes showing ___.    I received additional history from ___ (EMS, family, previous charts) which revealed ___.    Labs were ordered and independently reviewed and demonstrate ___.    Imaging was ordered and independently reviewed and demonstrates ___.    An EKG was ordered and independently reviewed and demonstrates ___.      Results and management discussed with ___ (radiology/cardiology/etc).    Medical care for this patient is impacted by ___ (SDOH with food/housing insecurity, inability to afford medications, substance misuse). Dr. Hanson: This is a 67-year-old male with no known past medical history, in the emergency department with reduced exercise tolerance for approximately 2 weeks.  Patient states that he is used to walking over 2 miles a day without any issues, recently started to feel like walking 2 miles made him very winded, more recently he has been unable to complete his walk due to feeling so short of breath.  He also feels at night when he lays flat that his abdomen is "tight", but he feels no shortness of breath when laying flat.  He also endorses that he has been eating less due to decreased appetite, and he thinks that he has lost approximately 9 pounds over the last 10 days.  He denies chest pain, nausea, vomiting, diarrhea.  On exam pt well appearing, in NAD, heart RRR, lungs CTAB, abd NTND, extremities without swelling, strength 5/5 in all extremities and skin without rash.     My DDx includes CHF vs. ACS vs. viral syndrome vs. pneumonia.    Labs were ordered and independently reviewed and demonstrate lactate 7, unclear etiology.  ?Type B lactate with acidosis.    An EKG was ordered and independently reviewed and demonstrates no acute findings.

## 2023-01-05 NOTE — ED PROVIDER NOTE - TEST CONSIDERED BUT NOT PERFORMED
Tests Considered But Not Performed no need for CTAP at this time as pt without acute abdominal findings on exam

## 2023-01-05 NOTE — ED PROVIDER NOTE - NS ED ROS FT
GENERAL: no fever  EYES: no eye pain  HEENT: no neck pain  CARDIAC: + chest pain  PULMONARY: + SOB  GI: + abdominal pain  : no dysuria  SKIN: no rashes  NEURO: no headache  MSK: no new joint pain

## 2023-01-05 NOTE — H&P ADULT - NSHPPHYSICALEXAM_GEN_ALL_CORE
Vital Signs Last 24 Hrs  T(C): 36.6 (06 Jan 2023 03:44), Max: 37.2 (05 Jan 2023 12:50)  T(F): 97.9 (06 Jan 2023 03:44), Max: 98.9 (05 Jan 2023 12:50)  HR: 65 (06 Jan 2023 03:44) (65 - 72)  BP: 137/73 (06 Jan 2023 03:44) (132/77 - 150/85)  BP(mean): --  RR: 17 (06 Jan 2023 03:44) (14 - 20)  SpO2: 100% (06 Jan 2023 03:44) (100% - 100%)    PHYSICAL EXAM:  General: Awake and alert.  No acute distress.  Head: Normocephalic, atraumatic.    Eyes: PERRL.  EOMI.  No scleral icterus.  No conjunctival pallor.  Mouth: Moist MM.  No oropharyngeal exudates.    Neck: Supple.  Full range of motion.  No JVD.  No LAD.  No thyromegaly.  Trachea midline.    Heart: No reproducible chest pain.  RRR.  Normal S1 and S2.  No murmurs, rubs, or gallops.  No LE edema b/l.   Lungs: Nonlabored breathing.  Good inspiratory effort.  CTAB.  No wheezes, crackles, or rhonchi.    Abdomen: BS+, soft, nontender with no rebound or guarding, nondistended.  No hepatomegaly.   Skin: Warm and dry.  No rashes.  Extremities: No cyanosis.  2+ peripheral pulses b/l.  Musculoskeletal: No joint deformities.  No spinal or paraspinal tenderness.  Neuro: A&Ox3.  CN II-XII intact.  5/5 motor strength in UE and LE b/l.  Tactile sensation intact in UE and LE b/l.  No focal deficits.  Psychiatric: Normal mood and full affect.  Denies any SI or HI.

## 2023-01-05 NOTE — ED PROVIDER NOTE - EKG ADDITIONAL INFORMATION FREE TEXT
Sinus rhythm.  Unremarkable rate.  QRS is nonspecifically widened without a specific aberrant conduction pattern.  Intervals are otherwise unremarkable.  The P wave morphology is unremarkable.  The QRS morphology is unremarkable.  No akanksha ischemic or infarction changes.

## 2023-01-05 NOTE — ED PROVIDER NOTE - CLINICAL SUMMARY MEDICAL DECISION MAKING FREE TEXT BOX
67-year-old male without  remarkable medical history presenting with a somewhat broad set of complaints including chest pain, shortness of breath and abdominal pain with reduced p.o. tolerance.  His vital signs are unremarkable.  His EKG is unremarkable.  And his exam is generally nonfocal.  There is a concern that this may represent some type of heart failure syndrome versus other acute cardiac overload in a patient with reduced ability to tolerate exercise and with associated chest discomfort and shortness of breath.  With that said the patient does not have any overt evidence of cardiopulmonary overload on exam.  Will assess for intra-abdominal source of what may be described as generalized weakness in a patient with surgical history at 67 years of age with decreased tolerance p.o.   other considerations include neoplastic process of the lung in a patient reporting increased thirst and with a somewhat nonspecific presentation, given that he will be sent back for CT abdomen pelvis, will obtain [facility] CT chest.  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  The risks of contrast are felt to outweigh the benefits.  We will also obtain troponin, BMP to assess for fluid overload.  CMP for electrolytes and renal function.  Continuous cardiac monitoring.  Likely admission for dyspnea and chest tightness on exertion and 67-year-old male. 67-year-old male without  remarkable medical history presenting with a somewhat broad set of complaints including chest pain, shortness of breath and abdominal pain with reduced p.o. tolerance.  His vital signs are unremarkable.  His EKG is unremarkable.  And his exam is generally nonfocal.  There is a concern that this may represent some type of heart failure syndrome versus other acute cardiac overload in a patient with reduced ability to tolerate exercise and with associated chest discomfort and shortness of breath.  With that said the patient does not have any overt evidence of cardiopulmonary overload on exam.  Will assess for intra-abdominal source of what may be described as generalized weakness in a patient with surgical history at 67 years of age with decreased tolerance p.o.   other considerations include neoplastic process of the lung in a patient reporting increased thirst and with a somewhat nonspecific presentation, given that he will be sent back for CT abdomen pelvis, will obtain tasneem mitten CT chest.  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  The risks of contrast are felt to outweigh the benefits.  We will also obtain troponin, BMP to assess for fluid overload.  CMP for electrolytes and renal function.  Continuous cardiac monitoring.  Likely admission for dyspnea and chest tightness on exertion and 67-year-old male.    Dr. Hanson: This is a 67-year-old male with no known past medical history, in the emergency department with reduced exercise tolerance for approximately 2 weeks.  Patient states that he is used to walking over 2 miles a day without any issues, recently started to feel like walking 2 miles made him very winded, more recently he has been unable to complete his walk due to feeling so short of breath.  He also feels at night when he lays flat that his abdomen is "tight", but he feels no shortness of breath when laying flat.  He also endorses that he has been eating less due to decreased appetite, and he thinks that he has lost approximately 9 pounds over the last 10 days.  He denies chest pain, nausea, vomiting, diarrhea.  On exam pt well appearing, in NAD, heart RRR, lungs CTAB, abd NTND, extremities without swelling, strength 5/5 in all extremities and skin without rash.     My DDx includes CHF vs. ACS vs. viral syndrome vs. pneumonia.    Labs were ordered and independently reviewed and demonstrate lactate 7, unclear etiology.  ?Type B lactate with acidosis.    An EKG was ordered and independently reviewed and demonstrates no acute findings.

## 2023-01-05 NOTE — ED PROVIDER NOTE - OBJECTIVE STATEMENT
This is a 67-year-old male, without known medical problems, presenting with a chief complaint of chest discomfort, shortness of breath and reduced tolerance p.o. all going on for about 2 weeks now.  The patient has associated generalized diffuse abdominal pain which appears to be intermittent.  He describes his chest pain as a tight squeeze around his chest.  He has been unable to do his daily 2 to 4 mile walk secondary to the chest pain and the shortness of breath.  He has had reduced ability to eat, his last bowel movement was this morning.  He is not frankly vomiting.  He does not take medications on a regular basis.  He does not have no medical problems.  He has had a cholecystectomy and an inguinal hernia repair in the past.  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

## 2023-01-05 NOTE — H&P ADULT - NSHPLABSRESULTS_GEN_ALL_CORE
EKGs personally reviewed.  12:08: NSR at 65 bpm, no acute ischemic changes, QTc 409 ms.  20:23: NSR at 60 bpm, no acute ischemic changes, QTc 416 ms.    Labs personally reviewed.                        15.9   6.86  )-----------( 237      ( 05 Jan 2023 13:15 )             49.4     01-05    143  |  106  |  10  ----------------------------<  96  3.7   |  28  |  1.15    Ca    9.3      05 Jan 2023 21:48  Phos  2.6     01-05  Mg     2.10     01-05    TPro  8.0  /  Alb  4.6  /  TBili  1.1  /  DBili  x   /  AST  31  /  ALT  26  /  AlkPhos  88  01-05    Hs-trops 8 -> 8  Pro-BNP 44    Imaging personally reviewed.  ACC: [id number] EXAM:  XR CHEST AP OR PA 1V                        PROCEDURE DATE:  01/05/2023    INTERPRETATION:  EXAMINATION: XR CHEST    CLINICAL INDICATION: chest pain and blunted bs bilaterally    TECHNIQUE: Single frontal view of the chest was obtained.    COMPARISON: None.    FINDINGS:  The heart is normal in size.  Anatomic variant azygous fissure noted in right apical region. The lungs are clear.  There is no pleural effusion.  There is no pneumothorax.  No acute bony abnormality.  Right upper quadrant clips.    IMPRESSION:  Clear lungs.

## 2023-01-05 NOTE — H&P ADULT - PROBLEM SELECTOR PLAN 1
Pt with 2 weeks of b/l chest pain, localized to his lower anterior ribs, described as a tightness/squeezing sensation, preceded by a frequent cough productive of white sputum, and associated with decreased exercise tolerance and dyspnea on exertion. Can be anginal pain, though can be from musculoskeletal, GI, or pulmonary etiology. Chest pain currently minimal.   - Hs-trops 8 -> 8. Initial and repeat EKGs with no acute ischemic changes.   - Pro-BNP 44. CXR with clear lungs. Pt euvolemic on exam.  - Monitor for recurrence/worsening of chest pain and obtain serial EKGs and cardiac enzymes  - TTE ordered  - CT chest and CTAP noncontrast ordered (pt with history of anaphylactic reaction to iodine)  - Check D-dimer  - Monitor on tele  - Possible ischemic eval with nuclear stress test and/or cardiac cath (pt will need to be premedicated due to iodine allergy)  - Cardiology (Dr. Johnston) to assume care in AM. Rest of management as per Cardiology. Pt with 2 weeks of b/l chest pain, localized to his lower anterior ribs, described as a tightness/squeezing sensation, preceded by a frequent cough productive of white sputum, and associated with decreased exercise tolerance and dyspnea on exertion. Can be anginal pain, though can be from musculoskeletal, GI, or pulmonary etiology. Chest pain currently minimal.   - Hs-trops 8 -> 8. Initial and repeat EKGs with no acute ischemic changes.   - Pro-BNP 44. CXR with clear lungs. Pt euvolemic on exam.  - Monitor for recurrence/worsening of chest pain and obtain serial EKGs and cardiac enzymes  - TTE ordered  - CT chest and CTAP noncontrast ordered (pt with history of anaphylactic reaction to iodine)  - Check D-dimer  - Check lipid profile  - Monitor on tele  - Possible ischemic eval with nuclear stress test and/or cardiac cath (pt will need to be premedicated due to iodine allergy)  - Cardiology (Dr. Johnston) to assume care in AM. Rest of management as per Cardiology.

## 2023-01-05 NOTE — H&P ADULT - REASON FOR ADMISSION
B/l chest pain and abdominal bloating B/l chest pain, decreased exercise tolerance, poor appetite/PO intake, and abdominal bloating

## 2023-01-05 NOTE — ED PROVIDER NOTE - PROGRESS NOTE DETAILS
Cedrick Gonzales MD (PGY-2): Serology was unremarkable.  Initial VBG showed a lactate of 7.1, felt to be erroneous given that repeat cath before fluid showed a normal lactate.  Third VBG was also unremarkable.  Patient will be admitted for exertional chest pain and shortness of breath.  Telemetry doc endorses admission.  Patient happy and agreeable with plan.

## 2023-01-05 NOTE — H&P ADULT - PROBLEM SELECTOR PLAN 5
- DVT ppx: Lovenox SQ 40 mg daily  - Diet: DASH/TLC for now, NPO after 10 am for possible ischemic eval - DVT ppx: Lovenox SQ 40 mg daily  - Diet: DASH/TLC for now, NPO after 10 am for possible ischemic eval  - PT consult

## 2023-01-05 NOTE — H&P ADULT - NSICDXFAMILYHX_GEN_ALL_CORE_FT
FAMILY HISTORY:  Mother  Still living? Unknown  Family history of pancreatic cancer, Age at diagnosis: Age Unknown

## 2023-01-05 NOTE — H&P ADULT - ASSESSMENT
66 yo man, former heavy smoker, with history of cholecystectomy and inguinal hernia repair presents with b/l chest pain, decreased exercise tolerance, poor appetite/PO intake, and abdominal bloating for past 2 weeks, admitted for further workup.

## 2023-01-05 NOTE — H&P ADULT - PROBLEM SELECTOR PLAN 2
Pt with ~10-lb weight loss over past 2 weeks in setting of poor appetite and PO intake. Possibly in setting of recent infection given frequent productive cough, though can be due to malignancy.  - CT chest and CTAP noncontrast ordered (pt with history of anaphylactic reaction to iodine) to evaluate for possible malignancy  - Nutrition consult if pt continues to have poor appetite and PO intake Pt with ~10-lb weight loss over past 2 weeks in setting of poor appetite and PO intake. Possibly in setting of recent infection given frequent productive cough, though can be due to malignancy.  - CT chest and CTAP noncontrast ordered (pt with history of anaphylactic reaction to iodine) to evaluate for possible malignancy  - Check TSH  - Nutrition consult if pt continues to have poor appetite and PO intake

## 2023-01-05 NOTE — ED ADULT NURSE NOTE - OBJECTIVE STATEMENT
Patient received in spot 21A. Patient A&Ox4 and ambulatory at baseline. Patient presents to the ED c/o worsening chest tightness x2 weeks. Patient denies significant PMH and PSH. Patient states for the last two weeks he has been experiencing generalized chest tightness, productive (mucous) cough, and shortness of breath with exertion. Airway patent, chest rise equal bilaterally, lung sounds clear and equal, respirations even and unlabored, no accessory muscle use noted, patient speaking in complete sentences; patient denies dyspnea and shortness of breath at this time. Patient denies chest pain and palpations. VSS, abdomen soft and non-distended; patient endorses episodes of loose stool in the last week. Patient denies changes with urine and pain with BMs/urination. Pulse/motor/sensory present in all four extremities; no edema noted in all four extremities. Awaiting MD orders.

## 2023-01-05 NOTE — ED PROVIDER NOTE - WR ORDER ID 1
[id number] Complex Repair And Dorsal Nasal Flap Text: The defect edges were debeveled with a #15 scalpel blade.  The primary defect was closed partially with a complex linear closure.  Given the location of the remaining defect, shape of the defect and the proximity to free margins a dorsal nasal flap was deemed most appropriate for complete closure of the defect.  Using a sterile surgical marker, an appropriate flap was drawn incorporating the defect and placing the expected incisions within the relaxed skin tension lines where possible.    The area thus outlined was incised deep to adipose tissue with a #15 scalpel blade.  The skin margins were undermined to an appropriate distance in all directions utilizing iris scissors.

## 2023-01-05 NOTE — ED PROVIDER NOTE - PHYSICAL EXAMINATION
Gen: NAD, non-toxic appearing  Head: normal appearing  HEENT: normal conjunctiva  Lung: no respiratory distress, speaking in full sentences,   Nonspecific coarse breath sounds heard primarily in end expiration  CV: regular rate and rhythm, no murmurs  Abd: soft, non distended, non tender   MSK: no visible deformities  Neuro: alert and grossly oriented, no gross motor deficits  Skin: No akanksha rashes

## 2023-01-05 NOTE — H&P ADULT - PROBLEM SELECTOR PLAN 4
Initial VBG with lactate 7.1 and pH 7.24. HCO3 on chemistry 27. Repeat VBG x2 with lactate wnl and pH 7.44 without any IVF. Suspect elevated lactate a lab error vs. type 2 lactic acidosis. Pt never hypoxic or hypotensive on admission.   - F/u CT chest and CTAP noncontrast

## 2023-01-05 NOTE — ED PROVIDER NOTE - WET READ LAUNCH FT
There are no Wet Read(s) to document. There is 1 Wet Read(s) to document. Reports sleep apnea managed with CPAP machine.

## 2023-01-05 NOTE — H&P ADULT - NSHPREVIEWOFSYSTEMS_GEN_ALL_CORE
Constitutional: No generalized weakness, fevers, chills, or weight loss  Eyes: No visual changes, double vision, or eye pain  Ears, Nose, Mouth, Throat: No runny nose, sinus pain, ear pain, tinnitus, sore throat, dysphagia, or odynophagia  Cardiovascular: No chest pain, palpitations, or LE edema  Respiratory: No cough, wheezing, hemoptysis, or shortness of breath  Gastrointestinal: No abdominal pain, dysphagia, anorexia, nausea/vomiting, diarrhea/constipation, hematemesis, melena, or BRBPR  Genitourinary: No dysuria, frequency, urgency, or hematuria  Musculoskeletal: No joint pain, joint swelling, or decreased ROM  Skin: No pruritus, rashes, lesions, or wounds  Neurologic:  No seizures, headache, paresthesias, numbness, or limb weakness  Psychiatric: No depression, anxiety, difficulty concentrating, anhedonia, or lack of energy  Endocrine: No heat/cold intolerance, mood swings, sweats, polydipsia, or polyuria  Hematologic/lymphatic: No purpura, petechia, or prolonged or excessive bleeding after dental extraction / injury  Allergic/Immunologic: No anaphylaxis or allergic response to materials, foods, animals    Positives and pertinent negatives noted and all other systems negative. Constitutional: +Decreased exercise tolerance. No generalized weakness, fevers, chills, or weight loss.  Eyes: No visual changes, double vision, or eye pain  Ears, Nose, Mouth, Throat: No runny nose, sinus pain, ear pain, tinnitus, sore throat, dysphagia, or odynophagia  Cardiovascular: +B/l chest pain. No palpitations or LE edema.  Respiratory: +Dyspnea on exertion. No cough, wheezing, or hemoptysis.  Gastrointestinal: +Abdominal bloating. No nausea/vomiting, diarrhea/constipation, hematemesis, melena, or BRBPR.  Genitourinary: No dysuria, frequency, urgency, or hematuria  Musculoskeletal: No back pain or joint pain, swelling, or decreased ROM  Skin: No pruritus or rashes  Neurologic: No syncope, seizures, headaches, paresthesias, numbness, or limb weakness  Psychiatric: No depression, anxiety, agitation, or SI/HI  Endocrine: No heat/cold intolerance, mood swings, sweats, polydipsia, or polyuria  Hematologic/lymphatic: No purpura, petechia, or prolonged or excessive bleeding after dental extraction / injury  Allergic/Immunologic: +Anaphylaxis to iodine    Positives and pertinent negatives noted and all other systems negative.

## 2023-01-05 NOTE — H&P ADULT - PROBLEM SELECTOR PLAN 3
Pt experiencing abdominal bloating at night for past 2 weeks, causing him to not be able to sleep for more than a couple hours at a time. Unclear etiology. Lipase wnl. Pt with no nausea or vomiting and passing flatus.   - F/u CTAP noncontrast   - Serial abdominal exams  - Maalox PRN

## 2023-01-06 DIAGNOSIS — R07.9 CHEST PAIN, UNSPECIFIED: ICD-10-CM

## 2023-01-06 DIAGNOSIS — R63.4 ABNORMAL WEIGHT LOSS: ICD-10-CM

## 2023-01-06 DIAGNOSIS — R79.89 OTHER SPECIFIED ABNORMAL FINDINGS OF BLOOD CHEMISTRY: ICD-10-CM

## 2023-01-06 DIAGNOSIS — R14.0 ABDOMINAL DISTENSION (GASEOUS): ICD-10-CM

## 2023-01-06 DIAGNOSIS — Z29.9 ENCOUNTER FOR PROPHYLACTIC MEASURES, UNSPECIFIED: ICD-10-CM

## 2023-01-06 LAB
ANION GAP SERPL CALC-SCNC: 13 MMOL/L — SIGNIFICANT CHANGE UP (ref 7–14)
BASOPHILS # BLD AUTO: 0.02 K/UL — SIGNIFICANT CHANGE UP (ref 0–0.2)
BASOPHILS NFR BLD AUTO: 0.3 % — SIGNIFICANT CHANGE UP (ref 0–2)
BUN SERPL-MCNC: 12 MG/DL — SIGNIFICANT CHANGE UP (ref 7–23)
CALCIUM SERPL-MCNC: 9.4 MG/DL — SIGNIFICANT CHANGE UP (ref 8.4–10.5)
CHLORIDE SERPL-SCNC: 106 MMOL/L — SIGNIFICANT CHANGE UP (ref 98–107)
CHOLEST SERPL-MCNC: 212 MG/DL — HIGH
CO2 SERPL-SCNC: 23 MMOL/L — SIGNIFICANT CHANGE UP (ref 22–31)
CREAT SERPL-MCNC: 1.09 MG/DL — SIGNIFICANT CHANGE UP (ref 0.5–1.3)
EGFR: 73 ML/MIN/1.73M2 — SIGNIFICANT CHANGE UP
EOSINOPHIL # BLD AUTO: 0.39 K/UL — SIGNIFICANT CHANGE UP (ref 0–0.5)
EOSINOPHIL NFR BLD AUTO: 5.6 % — SIGNIFICANT CHANGE UP (ref 0–6)
GLUCOSE SERPL-MCNC: 96 MG/DL — SIGNIFICANT CHANGE UP (ref 70–99)
HCT VFR BLD CALC: 46.9 % — SIGNIFICANT CHANGE UP (ref 39–50)
HCV AB S/CO SERPL IA: 0.13 S/CO — SIGNIFICANT CHANGE UP (ref 0–0.99)
HCV AB SERPL-IMP: SIGNIFICANT CHANGE UP
HDLC SERPL-MCNC: 42 MG/DL — SIGNIFICANT CHANGE UP
HGB BLD-MCNC: 15.3 G/DL — SIGNIFICANT CHANGE UP (ref 13–17)
IANC: 3.54 K/UL — SIGNIFICANT CHANGE UP (ref 1.8–7.4)
IMM GRANULOCYTES NFR BLD AUTO: 0.1 % — SIGNIFICANT CHANGE UP (ref 0–0.9)
LIPID PNL WITH DIRECT LDL SERPL: 137 MG/DL — HIGH
LYMPHOCYTES # BLD AUTO: 2.19 K/UL — SIGNIFICANT CHANGE UP (ref 1–3.3)
LYMPHOCYTES # BLD AUTO: 31.3 % — SIGNIFICANT CHANGE UP (ref 13–44)
MAGNESIUM SERPL-MCNC: 2.1 MG/DL — SIGNIFICANT CHANGE UP (ref 1.6–2.6)
MCHC RBC-ENTMCNC: 30.4 PG — SIGNIFICANT CHANGE UP (ref 27–34)
MCHC RBC-ENTMCNC: 32.6 GM/DL — SIGNIFICANT CHANGE UP (ref 32–36)
MCV RBC AUTO: 93.1 FL — SIGNIFICANT CHANGE UP (ref 80–100)
MONOCYTES # BLD AUTO: 0.84 K/UL — SIGNIFICANT CHANGE UP (ref 0–0.9)
MONOCYTES NFR BLD AUTO: 12 % — SIGNIFICANT CHANGE UP (ref 2–14)
NEUTROPHILS # BLD AUTO: 3.54 K/UL — SIGNIFICANT CHANGE UP (ref 1.8–7.4)
NEUTROPHILS NFR BLD AUTO: 50.7 % — SIGNIFICANT CHANGE UP (ref 43–77)
NON HDL CHOLESTEROL: 170 MG/DL — HIGH
NRBC # BLD: 0 /100 WBCS — SIGNIFICANT CHANGE UP (ref 0–0)
NRBC # FLD: 0 K/UL — SIGNIFICANT CHANGE UP (ref 0–0)
PHOSPHATE SERPL-MCNC: 2.8 MG/DL — SIGNIFICANT CHANGE UP (ref 2.5–4.5)
PLATELET # BLD AUTO: 214 K/UL — SIGNIFICANT CHANGE UP (ref 150–400)
POTASSIUM SERPL-MCNC: 4 MMOL/L — SIGNIFICANT CHANGE UP (ref 3.5–5.3)
POTASSIUM SERPL-SCNC: 4 MMOL/L — SIGNIFICANT CHANGE UP (ref 3.5–5.3)
RBC # BLD: 5.04 M/UL — SIGNIFICANT CHANGE UP (ref 4.2–5.8)
RBC # FLD: 13.2 % — SIGNIFICANT CHANGE UP (ref 10.3–14.5)
SODIUM SERPL-SCNC: 142 MMOL/L — SIGNIFICANT CHANGE UP (ref 135–145)
TRIGL SERPL-MCNC: 166 MG/DL — HIGH
TSH SERPL-MCNC: 1.67 UIU/ML — SIGNIFICANT CHANGE UP (ref 0.27–4.2)
WBC # BLD: 6.99 K/UL — SIGNIFICANT CHANGE UP (ref 3.8–10.5)
WBC # FLD AUTO: 6.99 K/UL — SIGNIFICANT CHANGE UP (ref 3.8–10.5)

## 2023-01-06 PROCEDURE — 74176 CT ABD & PELVIS W/O CONTRAST: CPT | Mod: 26

## 2023-01-06 PROCEDURE — 93306 TTE W/DOPPLER COMPLETE: CPT | Mod: 26

## 2023-01-06 PROCEDURE — 71250 CT THORAX DX C-: CPT | Mod: 26

## 2023-01-06 RX ORDER — ACETAMINOPHEN 500 MG
650 TABLET ORAL EVERY 6 HOURS
Refills: 0 | Status: DISCONTINUED | OUTPATIENT
Start: 2023-01-06 | End: 2023-01-08

## 2023-01-06 RX ORDER — LANOLIN ALCOHOL/MO/W.PET/CERES
3 CREAM (GRAM) TOPICAL AT BEDTIME
Refills: 0 | Status: DISCONTINUED | OUTPATIENT
Start: 2023-01-06 | End: 2023-01-08

## 2023-01-06 RX ORDER — ENOXAPARIN SODIUM 100 MG/ML
40 INJECTION SUBCUTANEOUS EVERY 24 HOURS
Refills: 0 | Status: DISCONTINUED | OUTPATIENT
Start: 2023-01-06 | End: 2023-01-08

## 2023-01-06 RX ORDER — ACETAMINOPHEN 500 MG
975 TABLET ORAL EVERY 6 HOURS
Refills: 0 | Status: DISCONTINUED | OUTPATIENT
Start: 2023-01-06 | End: 2023-01-08

## 2023-01-06 RX ORDER — INFLUENZA VIRUS VACCINE 15; 15; 15; 15 UG/.5ML; UG/.5ML; UG/.5ML; UG/.5ML
0.7 SUSPENSION INTRAMUSCULAR ONCE
Refills: 0 | Status: DISCONTINUED | OUTPATIENT
Start: 2023-01-06 | End: 2023-01-08

## 2023-01-06 RX ADMIN — Medication 3 MILLIGRAM(S): at 21:20

## 2023-01-06 RX ADMIN — ENOXAPARIN SODIUM 40 MILLIGRAM(S): 100 INJECTION SUBCUTANEOUS at 18:00

## 2023-01-06 NOTE — PATIENT PROFILE ADULT - NSPROPASSIVESMOKEEXPOSURE_GEN_A_NUR
Maddison Singleton is a 20 year old female presenting with a recheck for asthma. States she has been doing a lot better, and reports the coughing has stopped. No other concerns today.    Health Maintenance Summary     Topic Due On Due Status Completed On Postpone Until Reason    IMMUNIZATION - HPV  Completed May 31, 2016      PAP SMEAR - CERVICAL CANCER SCREENING May 1, 2017 Overdue       Immunization - TDAP Pregnancy  Hidden       IMMUNIZATION - DTaP/Tdap/Td Sep 10, 2017 Postponed Sep 10, 2007 May 24, 2018 Patient Refused    Immunization-Influenza Sep 1, 2018 Not Due Nov 2, 2010      Depression Screening Apr 24, 2019 Not Due Apr 24, 2018            Patient is due for topics as listed above, she wishes to discuss with provider .             No

## 2023-01-06 NOTE — PATIENT PROFILE ADULT - FALL HARM RISK - RISK INTERVENTIONS
Communicate Fall Risk and Risk Factors to all staff, patient, and family/Reinforce activity limits and safety measures with patient and family/Use of alarms - bed, chair and/or voice tab/Visual Cue: Yellow wristband/Bed in lowest position, wheels locked, appropriate side rails in place/Call bell, personal items and telephone in reach/Instruct patient to call for assistance before getting out of bed or chair/Non-slip footwear when patient is out of bed/[place] to call system/Physically safe environment - no spills, clutter or unnecessary equipment/Purposeful Proactive Rounding/Room/bathroom lighting operational, light cord in reach

## 2023-01-06 NOTE — PHYSICAL THERAPY INITIAL EVALUATION ADULT - PERTINENT HX OF CURRENT PROBLEM, REHAB EVAL
Pt. admitted for bilateral chest pain, decreased exercise tolerance, poor appetite/PO intake, and abdominal bloating. Per cardiology documentation, atrypical, EKG non-ischemic, chest xray clear, trop negative.

## 2023-01-06 NOTE — CONSULT NOTE ADULT - SUBJECTIVE AND OBJECTIVE BOX
C A R D I O L O G Y  *********************    DATE OF SERVICE: 01-06-23    HISTORY OF PRESENT ILLNESS: HPI:  Pt is a 66 yo man, former heavy smoker, with history of cholecystectomy and inguinal hernia repair presents with b/l chest pain, decreased exercise tolerance, poor appetite/PO intake, and abdominal bloating for past 2 weeks. Pt states that just before Shikha, he started having a frequent productive cough, bringing up lots of white sputum. Soon after onset of the cough, pt began having b/l chest pain, localized to his lower anterior ribs. Pt describes the chest pain as a tightness/squeezing sensation, feeling like someone is pushing his rib cage in on both sides. Prior to 2 weeks ago, pt was walking 2 miles every morning without any issues, but over the past 2 weeks, pt has been able to take his daily 2-mile walk only twice, with pt feeling severe b/l chest tightness during his walk and exhaustion after returning home. Pt would also become easily fatigued, even occasionally short of breath, with routine activity at home. Pt also notes that due to the copious amount of phlegm he was having with the cough, he lost his appetite, forgoing most meals over the 2-week period. Pt has lost about 10 lbs during this time. Pt went to urgent care this past Sunday to get evaluated for his symptoms and had a negative COVID-19 test and CXR, with no medications prescribed. Pt reports that while his cough and b/l chest pain have been gradually improving since his urgent care visit, pt has been having abdominal bloating at night, causing him to not be able to sleep for more than a couple hours at a time. Pt denies any orthopnea, PND, LE pain/swelling, nausea, or vomiting.     Pt also denies any recent fevers, chills, palpitations, lightheadedness, dizziness, headaches, back pain, diarrhea, constipation, melena, BRBPR, or urinary symptoms.     In the ED,T 98-98.9, HR 66-67, -137/76-91, RR 14-20, SpO2 100% RA.    PAST MEDICAL & SURGICAL HISTORY:  No pertinent past medical history  H/O inguinal hernia repair  History of cholecystectomy      MEDICATIONS:  MEDICATIONS  (STANDING):  enoxaparin Injectable 40 milliGRAM(s) SubCutaneous every 24 hours  influenza  Vaccine (HIGH DOSE) 0.7 milliLiter(s) IntraMuscular once      Allergies: iodine (Anaphylaxis)      FAMILY HISTORY:  Family history of pancreatic cancer (Mother)      SOCIAL HISTORY:    [X ] Non-smoker  [ ] Smoker  [ ] Alcohol    FLU VACCINE THIS YEAR STARTS IN AUGUST:  [ ] Yes    [ ] No    IF OVER 65 HAVE YOU EVER HAD A PNA VACCINE:  [ ] Yes    [ ] No       [ ] N/A      REVIEW OF SYSTEMS:  [ ]chest pain  [ X ]shortness of breath  [  ]palpitations  [  ]syncope  [ ]near syncope [ ]upper extremity weakness   [ ] lower extremity weakness  [  ]diplopia  [  ]altered mental status   [  ]fevers  [ ]chills [ ]nausea  [ ]vomiting  [  ]dysphagia    [ ]abdominal pain  [ ]melena  [ ]BRBPR    [  ]epistaxis  [  ]rash    [ ]lower extremity edema    [] All others negative	  [ ] Unable to obtain    LABS:	 	    CARDIAC MARKERS:                     15.3   6.99  )-----------( 214      ( 06 Jan 2023 05:39 )             46.9     Hb Trend: 15.3<--, 15.9<--    01-06    142  |  106  |  12  ----------------------------<  96  4.0   |  23  |  1.09    Ca    9.4      06 Jan 2023 05:39  Phos  2.8     01-06  Mg     2.10     01-06    TPro  8.0  /  Alb  4.6  /  TBili  1.1  /  DBili  x   /  AST  31  /  ALT  26  /  AlkPhos  88  01-05    Creatinine Trend: 1.09<--, 1.15<--, 1.15<--    proBNP: Serum Pro-Brain Natriuretic Peptide: 44 pg/mL (01-05 @ 14:22)    TSH: Thyroid Stimulating Hormone, Serum: 1.67 uIU/mL (01-06 @ 05:39)    PHYSICAL EXAM:  T(C): 36.6 (01-06-23 @ 05:46), Max: 37.2 (01-05-23 @ 12:50)  HR: 64 (01-06-23 @ 05:46) (64 - 72)  BP: 129/81 (01-06-23 @ 05:46) (129/81 - 150/85)  RR: 17 (01-06-23 @ 05:46) (14 - 20)  SpO2: 100% (01-06-23 @ 05:46) (100% - 100%)  Wt(kg): --   BMI (kg/m2): 26.8 (01-05-23 @ 23:50)  I&O's Summary    General: Well nourished in no acute distress. Alert and Oriented * 3.   Head: Normocephalic and atraumatic.   Neck: No JVD. No bruits. Supple. Does not appear to be enlarged.   Cardiovascular: + S1,S2 ; RRR Soft systolic murmur at the left lower sternal border. No rubs noted.    Lungs: CTA b/l. No rhonchi, rales or wheezes.   Abdomen: + BS, soft. Non tender. Non distended. No rebound. No guarding.   Extremities: No clubbing/cyanosis/edema.   Neurologic: Moves all four extremities. Full range of motion.   Skin: Warm and moist. The patient's skin has normal elasticity and good skin turgor.   Psychiatric: Appropriate mood and affect.  Musculoskeletal: Normal range of motion, normal strength       TELEMETRY:  Sinus rhythm/Sinus Owen    ECG:  	NSR    RADIOLOGY:  CXR:  The heart is normal in size.  Anatomic variant azygous fissure noted in right apical region. The lungs   are clear.  There is no pleural effusion.  There is no pneumothorax.  No acute bony abnormality.  Right upper quadrant clips.    IMPRESSION:  Clear lungs.     ASSESSMENT/PLAN: Pt is a 66 yo man, former heavy smoker, with history of cholecystectomy and inguinal hernia repair presents with b/l chest pain, decreased exercise tolerance, poor appetite/PO intake, and abdominal bloating for past 2 weeks.    1. B/L chest tightness, phlegm production/SOB  - EKG non-ischemic, chest xray clear, trop neagtive, BNP normal  -     C A R D I O L O G Y  *********************    DATE OF SERVICE: 01-06-23    HISTORY OF PRESENT ILLNESS: HPI:  Pt is a 68 yo man, former heavy smoker, with history of cholecystectomy and inguinal hernia repair presents with b/l chest pain, decreased exercise tolerance, poor appetite/PO intake, and abdominal bloating for past 2 weeks. Pt states that just before Shikha, he started having a frequent productive cough, bringing up lots of white sputum. Soon after onset of the cough, pt began having b/l chest pain, localized to his lower anterior ribs. Pt describes the chest pain as a tightness/squeezing sensation, feeling like someone is pushing his rib cage in on both sides. Prior to 2 weeks ago, pt was walking 2 miles every morning without any issues, but over the past 2 weeks, pt has been able to take his daily 2-mile walk only twice, with pt feeling severe b/l chest tightness during his walk and exhaustion after returning home. Pt would also become easily fatigued, even occasionally short of breath, with routine activity at home. Pt also notes that due to the copious amount of phlegm he was having with the cough, he lost his appetite, forgoing most meals over the 2-week period. Pt has lost about 10 lbs during this time. Pt went to urgent care this past Sunday to get evaluated for his symptoms and had a negative COVID-19 test and CXR, with no medications prescribed. Pt reports that while his cough and b/l chest pain have been gradually improving since his urgent care visit, pt has been having abdominal bloating at night, causing him to not be able to sleep for more than a couple hours at a time. Pt denies any orthopnea, PND, LE pain/swelling, nausea, or vomiting.     Pt also denies any recent fevers, chills, palpitations, lightheadedness, dizziness, headaches, back pain, diarrhea, constipation, melena, BRBPR, or urinary symptoms.     In the ED,T 98-98.9, HR 66-67, -137/76-91, RR 14-20, SpO2 100% RA.    PAST MEDICAL & SURGICAL HISTORY:  No pertinent past medical history  H/O inguinal hernia repair  History of cholecystectomy      MEDICATIONS:  MEDICATIONS  (STANDING):  enoxaparin Injectable 40 milliGRAM(s) SubCutaneous every 24 hours  influenza  Vaccine (HIGH DOSE) 0.7 milliLiter(s) IntraMuscular once      Allergies: iodine (Anaphylaxis)      FAMILY HISTORY:  Family history of pancreatic cancer (Mother)      SOCIAL HISTORY:    [X ] Non-smoker  [ ] Smoker  [ ] Alcohol    FLU VACCINE THIS YEAR STARTS IN AUGUST:  [ ] Yes    [ ] No    IF OVER 65 HAVE YOU EVER HAD A PNA VACCINE:  [ ] Yes    [ ] No       [ ] N/A      REVIEW OF SYSTEMS:  [ ]chest pain  [ X ]shortness of breath  [  ]palpitations  [  ]syncope  [ ]near syncope [ ]upper extremity weakness   [ ] lower extremity weakness  [  ]diplopia  [  ]altered mental status   [  ]fevers  [ ]chills [ ]nausea  [ ]vomiting  [  ]dysphagia    [ ]abdominal pain  [ ]melena  [ ]BRBPR    [  ]epistaxis  [  ]rash    [ ]lower extremity edema    [] All others negative	  [ ] Unable to obtain    LABS:	 	    CARDIAC MARKERS:                     15.3   6.99  )-----------( 214      ( 06 Jan 2023 05:39 )             46.9     Hb Trend: 15.3<--, 15.9<--    01-06    142  |  106  |  12  ----------------------------<  96  4.0   |  23  |  1.09    Ca    9.4      06 Jan 2023 05:39  Phos  2.8     01-06  Mg     2.10     01-06    TPro  8.0  /  Alb  4.6  /  TBili  1.1  /  DBili  x   /  AST  31  /  ALT  26  /  AlkPhos  88  01-05    Creatinine Trend: 1.09<--, 1.15<--, 1.15<--    proBNP: Serum Pro-Brain Natriuretic Peptide: 44 pg/mL (01-05 @ 14:22)    TSH: Thyroid Stimulating Hormone, Serum: 1.67 uIU/mL (01-06 @ 05:39)    PHYSICAL EXAM:  T(C): 36.6 (01-06-23 @ 05:46), Max: 37.2 (01-05-23 @ 12:50)  HR: 64 (01-06-23 @ 05:46) (64 - 72)  BP: 129/81 (01-06-23 @ 05:46) (129/81 - 150/85)  RR: 17 (01-06-23 @ 05:46) (14 - 20)  SpO2: 100% (01-06-23 @ 05:46) (100% - 100%)  Wt(kg): --   BMI (kg/m2): 26.8 (01-05-23 @ 23:50)  I&O's Summary    General: Well nourished in no acute distress. Alert and Oriented * 3.   Head: Normocephalic and atraumatic.   Neck: No JVD. No bruits. Supple. Does not appear to be enlarged.   Cardiovascular: + S1,S2 ; RRR Soft systolic murmur at the left lower sternal border. No rubs noted.    Lungs: CTA b/l. No rhonchi, rales or wheezes.   Abdomen: + BS, soft. Non tender. Non distended. No rebound. No guarding.   Extremities: No clubbing/cyanosis/edema.   Neurologic: Moves all four extremities. Full range of motion.   Skin: Warm and moist. The patient's skin has normal elasticity and good skin turgor.   Psychiatric: Appropriate mood and affect.  Musculoskeletal: Normal range of motion, normal strength       TELEMETRY:  Sinus rhythm/Sinus Owen    ECG:  	NSR    RADIOLOGY:  CXR:  The heart is normal in size.  Anatomic variant azygous fissure noted in right apical region. The lungs   are clear.  There is no pleural effusion.  There is no pneumothorax.  No acute bony abnormality.  Right upper quadrant clips.    IMPRESSION:  Clear lungs.     ASSESSMENT/PLAN: Pt is a 68 yo man, former heavy smoker, with history of cholecystectomy and inguinal hernia repair presents with b/l chest pain, decreased exercise tolerance, poor appetite/PO intake, and abdominal bloating for past 2 weeks.    1. B/L chest tightness, phlegm production/SOB  - atrypical, reports tightness with rest and exertion along with SOB  - EKG non-ischemic, chest xray clear, trop neagtive, BNP normal  - f/u Ct [facility]  - agree with echo and stress    Rohit Servin MD  Pager: [phone]

## 2023-01-06 NOTE — PROGRESS NOTE ADULT - SUBJECTIVE AND OBJECTIVE BOX
Patient is a 67y old  Male who presents with a chief complaint of B/l chest pain, decreased exercise tolerance, poor appetite/PO intake, and abdominal bloating (06 Jan 2023 09:52)      INTERVAL HPI/OVERNIGHT EVENTS:  T(C): 37 (01-06-23 @ 20:05), Max: 37 (01-06-23 @ 20:05)  HR: 62 (01-06-23 @ 20:05) (62 - 86)  BP: 121/77 (01-06-23 @ 20:05) (115/67 - 150/85)  RR: 18 (01-06-23 @ 20:05) (17 - 18)  SpO2: 99% (01-06-23 @ 20:05) (99% - 100%)  Wt(kg): --  I&O's Summary      PAST MEDICAL & SURGICAL HISTORY:  No pertinent past medical history      H/O inguinal hernia repair      History of cholecystectomy          SOCIAL HISTORY  Alcohol:  Tobacco:  Illicit substance use:    FAMILY HISTORY:    REVIEW OF SYSTEMS:  CONSTITUTIONAL: No fever, weight loss, or fatigue  EYES: No eye pain, visual disturbances, or discharge  ENMT:  No difficulty hearing, tinnitus, vertigo; No sinus or throat pain  NECK: No pain or stiffness  RESPIRATORY: No cough, wheezing, chills or hemoptysis; No shortness of breath  CARDIOVASCULAR: No chest pain, palpitations, dizziness, or leg swelling  GASTROINTESTINAL: No abdominal or epigastric pain. No nausea, vomiting, or hematemesis; No diarrhea or constipation. No melena or hematochezia.  GENITOURINARY: No dysuria, frequency, hematuria, or incontinence  NEUROLOGICAL: No headaches, memory loss, loss of strength, numbness, or tremors  SKIN: No itching, burning, rashes, or lesions   LYMPH NODES: No enlarged glands  ENDOCRINE: No heat or cold intolerance; No hair loss  MUSCULOSKELETAL: No joint pain or swelling; No muscle, back, or extremity pain  PSYCHIATRIC: No depression, anxiety, mood swings, or difficulty sleeping  HEME/LYMPH: No easy bruising, or bleeding gums  ALLERY AND IMMUNOLOGIC: No hives or eczema    RADIOLOGY & ADDITIONAL TESTS:    Imaging Personally Reviewed:  [ ] YES  [ ] NO    Consultant(s) Notes Reviewed:  [ ] YES  [ ] NO    PHYSICAL EXAM:  GENERAL: NAD, well-groomed, well-developed  HEAD:  Atraumatic, Normocephalic  EYES: EOMI, PERRLA, conjunctiva and sclera clear  ENMT: No tonsillar erythema, exudates, or enlargement; Moist mucous membranes, Good dentition, No lesions  NECK: Supple, No JVD, Normal thyroid  NERVOUS SYSTEM:  Alert & Oriented X3, Good concentration; Motor Strength 5/5 B/L upper and lower extremities; DTRs 2+ intact and symmetric  CHEST/LUNG: Clear to percussion bilaterally; No rales, rhonchi, wheezing, or rubs  HEART: Regular rate and rhythm; No murmurs, rubs, or gallops  ABDOMEN: Soft, Nontender, Nondistended; Bowel sounds present  EXTREMITIES:  2+ Peripheral Pulses, No clubbing, cyanosis, or edema  LYMPH: No lymphadenopathy noted  SKIN: No rashes or lesions    LABS:                        15.3   6.99  )-----------( 214      ( 06 Jan 2023 05:39 )             46.9     01-06    142  |  106  |  12  ----------------------------<  96  4.0   |  23  |  1.09    Ca    9.4      06 Jan 2023 05:39  Phos  2.8     01-06  Mg     2.10     01-06    TPro  8.0  /  Alb  4.6  /  TBili  1.1  /  DBili  x   /  AST  31  /  ALT  26  /  AlkPhos  88  01-05    PT/INR - ( 05 Jan 2023 13:15 )   PT: 12.1 sec;   INR: 1.04 ratio         PTT - ( 05 Jan 2023 13:15 )  PTT:30.7 sec    CAPILLARY BLOOD GLUCOSE                MEDICATIONS  (STANDING):  enoxaparin Injectable 40 milliGRAM(s) SubCutaneous every 24 hours  influenza  Vaccine (HIGH DOSE) 0.7 milliLiter(s) IntraMuscular once    MEDICATIONS  (PRN):  acetaminophen     Tablet .. 650 milliGRAM(s) Oral every 6 hours PRN Temp greater or equal to 38C (100.4F), Mild Pain (1 - 3), Moderate Pain (4 - 6)  acetaminophen     Tablet .. 975 milliGRAM(s) Oral every 6 hours PRN Severe Pain (7 - 10)  aluminum hydroxide/magnesium hydroxide/simethicone Suspension 30 milliLiter(s) Oral every 4 hours PRN Dyspepsia  melatonin 3 milliGRAM(s) Oral at bedtime PRN Insomnia      Care Discussed with Consultants/Other Providers [ ] YES  [ ] NO Patient is a 67y old  Male who presents with a chief complaint of B/l chest pain, decreased exercise tolerance, poor appetite/PO intake, and abdominal bloating (06 Jan 2023 09:52)      INTERVAL HPI/OVERNIGHT EVENTS: c/o coughing with some phlegm production , no fever , no CP now   T(C): 37 (01-06-23 @ 20:05), Max: 37 (01-06-23 @ 20:05)  HR: 62 (01-06-23 @ 20:05) (62 - 86)  BP: 121/77 (01-06-23 @ 20:05) (115/67 - 150/85)  RR: 18 (01-06-23 @ 20:05) (17 - 18)  SpO2: 99% (01-06-23 @ 20:05) (99% - 100%)  Wt(kg): --  I&O's Summary      PAST MEDICAL & SURGICAL HISTORY:  No pertinent past medical history      H/O inguinal hernia repair      History of cholecystectomy          SOCIAL HISTORY  Alcohol:  Tobacco:  Illicit substance use:    FAMILY HISTORY:    REVIEW OF SYSTEMS:  CONSTITUTIONAL: No fever, weight loss, or fatigue  EYES: No eye pain, visual disturbances, or discharge  ENMT:  No difficulty hearing, tinnitus, vertigo; No sinus or throat pain  NECK: No pain or stiffness  RESPIRATORY: No cough, wheezing, chills or hemoptysis; No shortness of breath  CARDIOVASCULAR: No chest pain, palpitations, dizziness, or leg swelling  GASTROINTESTINAL: No abdominal or epigastric pain. No nausea, vomiting, or hematemesis; No diarrhea or constipation. No melena or hematochezia.  GENITOURINARY: No dysuria, frequency, hematuria, or incontinence  NEUROLOGICAL: No headaches, memory loss, loss of strength, numbness, or tremors  SKIN: No itching, burning, rashes, or lesions   LYMPH NODES: No enlarged glands  ENDOCRINE: No heat or cold intolerance; No hair loss  MUSCULOSKELETAL: No joint pain or swelling; No muscle, back, or extremity pain  PSYCHIATRIC: No depression, anxiety, mood swings, or difficulty sleeping  HEME/LYMPH: No easy bruising, or bleeding gums  ALLERY AND IMMUNOLOGIC: No hives or eczema    RADIOLOGY & ADDITIONAL TESTS:    Imaging Personally Reviewed:  [ ] YES  [ ] NO    Consultant(s) Notes Reviewed:  [ ] YES  [ ] NO    PHYSICAL EXAM:  GENERAL: NAD, well-groomed, well-developed  HEAD:  Atraumatic, Normocephalic  EYES: EOMI, PERRLA, conjunctiva and sclera clear  ENMT: No tonsillar erythema, exudates, or enlargement; Moist mucous membranes, Good dentition, No lesions  NECK: Supple, No JVD, Normal thyroid  NERVOUS SYSTEM:  Alert & Oriented X3, Good concentration; Motor Strength 5/5 B/L upper and lower extremities; DTRs 2+ intact and symmetric  CHEST/LUNG: Clear to percussion bilaterally; No rales, rhonchi, wheezing, or rubs  HEART: Regular rate and rhythm; No murmurs, rubs, or gallops  ABDOMEN: Soft, Nontender, Nondistended; Bowel sounds present  EXTREMITIES:  2+ Peripheral Pulses, No clubbing, cyanosis, or edema  LYMPH: No lymphadenopathy noted  SKIN: No rashes or lesions    LABS:                        15.3   6.99  )-----------( 214      ( 06 Jan 2023 05:39 )             46.9     01-06    142  |  106  |  12  ----------------------------<  96  4.0   |  23  |  1.09    Ca    9.4      06 Jan 2023 05:39  Phos  2.8     01-06  Mg     2.10     01-06    TPro  8.0  /  Alb  4.6  /  TBili  1.1  /  DBili  x   /  AST  31  /  ALT  26  /  AlkPhos  88  01-05    PT/INR - ( 05 Jan 2023 13:15 )   PT: 12.1 sec;   INR: 1.04 ratio         PTT - ( 05 Jan 2023 13:15 )  PTT:30.7 sec    CAPILLARY BLOOD GLUCOSE                MEDICATIONS  (STANDING):  enoxaparin Injectable 40 milliGRAM(s) SubCutaneous every 24 hours  influenza  Vaccine (HIGH DOSE) 0.7 milliLiter(s) IntraMuscular once    MEDICATIONS  (PRN):  acetaminophen     Tablet .. 650 milliGRAM(s) Oral every 6 hours PRN Temp greater or equal to 38C (100.4F), Mild Pain (1 - 3), Moderate Pain (4 - 6)  acetaminophen     Tablet .. 975 milliGRAM(s) Oral every 6 hours PRN Severe Pain (7 - 10)  aluminum hydroxide/magnesium hydroxide/simethicone Suspension 30 milliLiter(s) Oral every 4 hours PRN Dyspepsia  melatonin 3 milliGRAM(s) Oral at bedtime PRN Insomnia      Care Discussed with Consultants/Other Providers [ ] YES  [ ] NO

## 2023-01-06 NOTE — PHYSICAL THERAPY INITIAL EVALUATION ADULT - ADDITIONAL COMMENTS
Pt. was left in bed post PT Evaluation, no apparent distress, all lines intact, call bell within reach.

## 2023-01-07 LAB
ANION GAP SERPL CALC-SCNC: 14 MMOL/L — SIGNIFICANT CHANGE UP (ref 7–14)
BUN SERPL-MCNC: 18 MG/DL — SIGNIFICANT CHANGE UP (ref 7–23)
CALCIUM SERPL-MCNC: 9.1 MG/DL — SIGNIFICANT CHANGE UP (ref 8.4–10.5)
CHLORIDE SERPL-SCNC: 104 MMOL/L — SIGNIFICANT CHANGE UP (ref 98–107)
CO2 SERPL-SCNC: 21 MMOL/L — LOW (ref 22–31)
CREAT SERPL-MCNC: 1.04 MG/DL — SIGNIFICANT CHANGE UP (ref 0.5–1.3)
EGFR: 78 ML/MIN/1.73M2 — SIGNIFICANT CHANGE UP
GLUCOSE SERPL-MCNC: 85 MG/DL — SIGNIFICANT CHANGE UP (ref 70–99)
HCT VFR BLD CALC: 45.1 % — SIGNIFICANT CHANGE UP (ref 39–50)
HGB BLD-MCNC: 15 G/DL — SIGNIFICANT CHANGE UP (ref 13–17)
MAGNESIUM SERPL-MCNC: 2 MG/DL — SIGNIFICANT CHANGE UP (ref 1.6–2.6)
MCHC RBC-ENTMCNC: 30.6 PG — SIGNIFICANT CHANGE UP (ref 27–34)
MCHC RBC-ENTMCNC: 33.3 GM/DL — SIGNIFICANT CHANGE UP (ref 32–36)
MCV RBC AUTO: 92 FL — SIGNIFICANT CHANGE UP (ref 80–100)
NRBC # BLD: 0 /100 WBCS — SIGNIFICANT CHANGE UP (ref 0–0)
NRBC # FLD: 0 K/UL — SIGNIFICANT CHANGE UP (ref 0–0)
PHOSPHATE SERPL-MCNC: 2.7 MG/DL — SIGNIFICANT CHANGE UP (ref 2.5–4.5)
PLATELET # BLD AUTO: 195 K/UL — SIGNIFICANT CHANGE UP (ref 150–400)
POTASSIUM SERPL-MCNC: 3.6 MMOL/L — SIGNIFICANT CHANGE UP (ref 3.5–5.3)
POTASSIUM SERPL-SCNC: 3.6 MMOL/L — SIGNIFICANT CHANGE UP (ref 3.5–5.3)
RBC # BLD: 4.9 M/UL — SIGNIFICANT CHANGE UP (ref 4.2–5.8)
RBC # FLD: 13 % — SIGNIFICANT CHANGE UP (ref 10.3–14.5)
SODIUM SERPL-SCNC: 139 MMOL/L — SIGNIFICANT CHANGE UP (ref 135–145)
WBC # BLD: 6.17 K/UL — SIGNIFICANT CHANGE UP (ref 3.8–10.5)
WBC # FLD AUTO: 6.17 K/UL — SIGNIFICANT CHANGE UP (ref 3.8–10.5)

## 2023-01-07 RX ADMIN — Medication 3 MILLIGRAM(S): at 22:44

## 2023-01-07 RX ADMIN — ENOXAPARIN SODIUM 40 MILLIGRAM(S): 100 INJECTION SUBCUTANEOUS at 18:10

## 2023-01-07 NOTE — PROGRESS NOTE ADULT - SUBJECTIVE AND OBJECTIVE BOX
Patient is a 67y old  Male who presents with a chief complaint of B/l chest pain, decreased exercise tolerance, poor appetite/PO intake, and abdominal bloating (07 Jan 2023 11:11)      INTERVAL HPI/OVERNIGHT EVENTS: seen and examined, denies any CP  T(C): 37.1 (01-07-23 @ 20:16), Max: 37.1 (01-07-23 @ 20:16)  HR: 71 (01-07-23 @ 20:16) (60 - 71)  BP: 121/81 (01-07-23 @ 20:16) (117/73 - 127/73)  RR: 18 (01-07-23 @ 20:16) (16 - 18)  SpO2: 98% (01-07-23 @ 20:16) (95% - 98%)  Wt(kg): --  I&O's Summary      PAST MEDICAL & SURGICAL HISTORY:  No pertinent past medical history      H/O inguinal hernia repair      History of cholecystectomy          SOCIAL HISTORY  Alcohol:  Tobacco:  Illicit substance use:    FAMILY HISTORY:    REVIEW OF SYSTEMS:  CONSTITUTIONAL: No fever, weight loss, or fatigue  EYES: No eye pain, visual disturbances, or discharge  ENMT:  No difficulty hearing, tinnitus, vertigo; No sinus or throat pain  NECK: No pain or stiffness  RESPIRATORY: No cough, wheezing, chills or hemoptysis; No shortness of breath  CARDIOVASCULAR: No chest pain, palpitations, dizziness, or leg swelling  GASTROINTESTINAL: No abdominal or epigastric pain. No nausea, vomiting, or hematemesis; No diarrhea or constipation. No melena or hematochezia.  GENITOURINARY: No dysuria, frequency, hematuria, or incontinence  NEUROLOGICAL: No headaches, memory loss, loss of strength, numbness, or tremors  SKIN: No itching, burning, rashes, or lesions   LYMPH NODES: No enlarged glands  ENDOCRINE: No heat or cold intolerance; No hair loss  MUSCULOSKELETAL: No joint pain or swelling; No muscle, back, or extremity pain  PSYCHIATRIC: No depression, anxiety, mood swings, or difficulty sleeping  HEME/LYMPH: No easy bruising, or bleeding gums  ALLERY AND IMMUNOLOGIC: No hives or eczema    RADIOLOGY & ADDITIONAL TESTS:    Imaging Personally Reviewed:  [ ] YES  [ ] NO    Consultant(s) Notes Reviewed:  [ ] YES  [ ] NO    PHYSICAL EXAM:  GENERAL: NAD, well-groomed, well-developed  HEAD:  Atraumatic, Normocephalic  EYES: EOMI, PERRLA, conjunctiva and sclera clear  ENMT: No tonsillar erythema, exudates, or enlargement; Moist mucous membranes, Good dentition, No lesions  NECK: Supple, No JVD, Normal thyroid  NERVOUS SYSTEM:  Alert & Oriented X3, Good concentration; Motor Strength 5/5 B/L upper and lower extremities; DTRs 2+ intact and symmetric  CHEST/LUNG: Clear to percussion bilaterally; No rales, rhonchi, wheezing, or rubs  HEART: Regular rate and rhythm; No murmurs, rubs, or gallops  ABDOMEN: Soft, Nontender, Nondistended; Bowel sounds present  EXTREMITIES:  2+ Peripheral Pulses, No clubbing, cyanosis, or edema  LYMPH: No lymphadenopathy noted  SKIN: No rashes or lesions    LABS:                        15.0   6.17  )-----------( 195      ( 07 Jan 2023 07:36 )             45.1     01-07    139  |  104  |  18  ----------------------------<  85  3.6   |  21<L>  |  1.04    Ca    9.1      07 Jan 2023 07:36  Phos  2.7     01-07  Mg     2.00     01-07          CAPILLARY BLOOD GLUCOSE                MEDICATIONS  (STANDING):  enoxaparin Injectable 40 milliGRAM(s) SubCutaneous every 24 hours  influenza  Vaccine (HIGH DOSE) 0.7 milliLiter(s) IntraMuscular once    MEDICATIONS  (PRN):  acetaminophen     Tablet .. 650 milliGRAM(s) Oral every 6 hours PRN Temp greater or equal to 38C (100.4F), Mild Pain (1 - 3), Moderate Pain (4 - 6)  acetaminophen     Tablet .. 975 milliGRAM(s) Oral every 6 hours PRN Severe Pain (7 - 10)  aluminum hydroxide/magnesium hydroxide/simethicone Suspension 30 milliLiter(s) Oral every 4 hours PRN Dyspepsia  melatonin 3 milliGRAM(s) Oral at bedtime PRN Insomnia      Care Discussed with Consultants/Other Providers [ ] YES  [ ] NO

## 2023-01-07 NOTE — PROGRESS NOTE ADULT - SUBJECTIVE AND OBJECTIVE BOX
pt seen and examined, no complaints, ROS - .     acetaminophen     Tablet .. 650 milliGRAM(s) Oral every 6 hours PRN  acetaminophen     Tablet .. 975 milliGRAM(s) Oral every 6 hours PRN  aluminum hydroxide/magnesium hydroxide/simethicone Suspension 30 milliLiter(s) Oral every 4 hours PRN  enoxaparin Injectable 40 milliGRAM(s) SubCutaneous every 24 hours  influenza  Vaccine (HIGH DOSE) 0.7 milliLiter(s) IntraMuscular once  melatonin 3 milliGRAM(s) Oral at bedtime PRN                            15.0   6.17  )-----------( 195      ( 07 Jan 2023 07:36 )             45.1       Hemoglobin: 15.0 g/dL (01-07 @ 07:36)  Hemoglobin: 15.3 g/dL (01-06 @ 05:39)  Hemoglobin: 15.9 g/dL (01-05 @ 13:15)      01-07    139  |  104  |  18  ----------------------------<  85  3.6   |  21<L>  |  1.04    Ca    9.1      07 Jan 2023 07:36  Phos  2.7     01-07  Mg     2.00     01-07    TPro  8.0  /  Alb  4.6  /  TBili  1.1  /  DBili  x   /  AST  31  /  ALT  26  /  AlkPhos  88  01-05    Creatinine Trend: 1.04<--, 1.09<--, 1.15<--, 1.15<--    COAGS:           T(C): 36.2 (01-07-23 @ 05:35), Max: 37 (01-06-23 @ 20:05)  HR: 60 (01-07-23 @ 05:35) (60 - 62)  BP: 117/73 (01-07-23 @ 05:35) (117/73 - 127/73)  RR: 16 (01-07-23 @ 05:35) (16 - 18)  SpO2: 95% (01-07-23 @ 05:35) (95% - 99%)  Wt(kg): --    I&O's Summary        General: Well nourished in no acute distress. Alert and Oriented * 3.   Head: Normocephalic and atraumatic.   Neck: No JVD. No bruits. Supple. Does not appear to be enlarged.   Cardiovascular: + S1,S2 ; RRR Soft systolic murmur at the left lower sternal border. No rubs noted.    Lungs: CTA b/l. No rhonchi, rales or wheezes.   Abdomen: + BS, soft. Non tender. Non distended. No rebound. No guarding.   Extremities: No clubbing/cyanosis/edema.   Neurologic: Moves all four extremities. Full range of motion.   Skin: Warm and moist. The patient's skin has normal elasticity and good skin turgor.   Psychiatric: Appropriate mood and affect.  Musculoskeletal: Normal range of motion, normal strength       TELEMETRY:  Sinus rhythm/Sinus Owen    ECG:  	NSR    RADIOLOGY:  CXR:  The heart is normal in size.  Anatomic variant azygous fissure noted in right apical region. The lungs   are clear.  There is no pleural effusion.  There is no pneumothorax.  No acute bony abnormality.  Right upper quadrant clips.    IMPRESSION:  Clear lungs.     ECHO: < from: Transthoracic Echocardiogram (01.06.23 @ 14:55) >  CONCLUSIONS:  1. Normal left ventricular systolic function. No segmental  wall motion abnormalities.  2. Normal right ventricular size and function.  ------------------------------------------------------------------------  Confirmed on  1/6/2023 - 16:02:42 by Atilio Mosher M.D.  ------------------------------------------------------------------------    < end of copied text >    ASSESSMENT/PLAN: Pt is a 66 yo man, former heavy smoker, with history of cholecystectomy and inguinal hernia repair presents with b/l chest pain, decreased exercise tolerance, poor appetite/PO intake, and abdominal bloating for past 2 weeks.    1. B/L chest tightness, phlegm production/SOB  - atrypical, reports tightness with rest and exertion along with SOB  - EKG non-ischemic, chest xray clear, trop neagtive, BNP normal  - f/u Ct sccan  -  stress pending

## 2023-01-07 NOTE — PROGRESS NOTE ADULT - ASSESSMENT
68 yo man, former heavy smoker, with history of cholecystectomy and inguinal hernia repair presents with b/l chest pain, decreased exercise tolerance, poor appetite/PO intake, and abdominal bloating for past 2 weeks, admitted for further workup.     Problem/Plan - 1:  ·  Problem: Chest pain.   ·  Plan:atypical   seen by cardio   recommend TTE and NST : if CT chest is neg   CT chest : result reviewed : wnl , small pul nodule , rest is normal      Problem/Plan - 2:  ·  Problem: Weight loss.   ·  Plan: CT chest / abd/pelvis : wnl        Problem/Plan - 3:  ·  Problem: Elevated lactic acid level.   ·  Plan: repeat level   pt. don't look toxic or septic     dispo: plan for NST, and check TTE  
68 yo man, former heavy smoker, with history of cholecystectomy and inguinal hernia repair presents with b/l chest pain, decreased exercise tolerance, poor appetite/PO intake, and abdominal bloating for past 2 weeks, admitted for further workup.     Problem/Plan - 1:  ·  Problem: Chest pain.   ·  Plan:atypical   seen by cardio   recommend TTE and NST : if CT chest is neg   CT chest : result reviewed : wnl , small pul nodule , rest is normal      Problem/Plan - 2:  ·  Problem: Weight loss.   ·  Plan: CT chest / abd/pelvis : wnl        Problem/Plan - 3:  ·  Problem: Elevated lactic acid level.   ·  Plan: repeat level   pt. don't look toxic or septic     dispo: plan for NST, and check TTE

## 2023-01-08 ENCOUNTER — TRANSCRIPTION ENCOUNTER (OUTPATIENT)
Age: 68
End: 2023-01-08

## 2023-01-08 VITALS
OXYGEN SATURATION: 100 % | SYSTOLIC BLOOD PRESSURE: 130 MMHG | DIASTOLIC BLOOD PRESSURE: 71 MMHG | TEMPERATURE: 98 F | RESPIRATION RATE: 17 BRPM | HEART RATE: 75 BPM

## 2023-01-08 LAB
ANION GAP SERPL CALC-SCNC: 11 MMOL/L — SIGNIFICANT CHANGE UP (ref 7–14)
BUN SERPL-MCNC: 15 MG/DL — SIGNIFICANT CHANGE UP (ref 7–23)
CALCIUM SERPL-MCNC: 9.2 MG/DL — SIGNIFICANT CHANGE UP (ref 8.4–10.5)
CHLORIDE SERPL-SCNC: 105 MMOL/L — SIGNIFICANT CHANGE UP (ref 98–107)
CO2 SERPL-SCNC: 22 MMOL/L — SIGNIFICANT CHANGE UP (ref 22–31)
CREAT SERPL-MCNC: 1.06 MG/DL — SIGNIFICANT CHANGE UP (ref 0.5–1.3)
EGFR: 76 ML/MIN/1.73M2 — SIGNIFICANT CHANGE UP
GLUCOSE SERPL-MCNC: 91 MG/DL — SIGNIFICANT CHANGE UP (ref 70–99)
HCT VFR BLD CALC: 45.5 % — SIGNIFICANT CHANGE UP (ref 39–50)
HGB BLD-MCNC: 15.2 G/DL — SIGNIFICANT CHANGE UP (ref 13–17)
MAGNESIUM SERPL-MCNC: 2.2 MG/DL — SIGNIFICANT CHANGE UP (ref 1.6–2.6)
MCHC RBC-ENTMCNC: 30.6 PG — SIGNIFICANT CHANGE UP (ref 27–34)
MCHC RBC-ENTMCNC: 33.4 GM/DL — SIGNIFICANT CHANGE UP (ref 32–36)
MCV RBC AUTO: 91.7 FL — SIGNIFICANT CHANGE UP (ref 80–100)
NRBC # BLD: 0 /100 WBCS — SIGNIFICANT CHANGE UP (ref 0–0)
NRBC # FLD: 0 K/UL — SIGNIFICANT CHANGE UP (ref 0–0)
PHOSPHATE SERPL-MCNC: 2.9 MG/DL — SIGNIFICANT CHANGE UP (ref 2.5–4.5)
PLATELET # BLD AUTO: 196 K/UL — SIGNIFICANT CHANGE UP (ref 150–400)
POTASSIUM SERPL-MCNC: 3.6 MMOL/L — SIGNIFICANT CHANGE UP (ref 3.5–5.3)
POTASSIUM SERPL-SCNC: 3.6 MMOL/L — SIGNIFICANT CHANGE UP (ref 3.5–5.3)
RBC # BLD: 4.96 M/UL — SIGNIFICANT CHANGE UP (ref 4.2–5.8)
RBC # FLD: 12.9 % — SIGNIFICANT CHANGE UP (ref 10.3–14.5)
SODIUM SERPL-SCNC: 138 MMOL/L — SIGNIFICANT CHANGE UP (ref 135–145)
WBC # BLD: 6.85 K/UL — SIGNIFICANT CHANGE UP (ref 3.8–10.5)
WBC # FLD AUTO: 6.85 K/UL — SIGNIFICANT CHANGE UP (ref 3.8–10.5)

## 2023-01-08 PROCEDURE — 78452 HT MUSCLE IMAGE SPECT MULT: CPT | Mod: 26

## 2023-01-08 PROCEDURE — 93016 CV STRESS TEST SUPVJ ONLY: CPT | Mod: GC

## 2023-01-08 PROCEDURE — 93018 CV STRESS TEST I&R ONLY: CPT | Mod: GC

## 2023-01-08 RX ORDER — ATORVASTATIN CALCIUM 80 MG/1
1 TABLET, FILM COATED ORAL
Qty: 30 | Refills: 0
Start: 2023-01-08 | End: 2023-02-06

## 2023-01-08 RX ORDER — ASPIRIN/CALCIUM CARB/MAGNESIUM 324 MG
1 TABLET ORAL
Qty: 30 | Refills: 0
Start: 2023-01-08 | End: 2023-02-06

## 2023-01-08 RX ORDER — METOPROLOL TARTRATE 50 MG
1 TABLET ORAL
Qty: 30 | Refills: 0
Start: 2023-01-08 | End: 2023-02-06

## 2023-01-08 NOTE — PROGRESS NOTE ADULT - REASON FOR ADMISSION
B/l chest pain, decreased exercise tolerance, poor appetite/PO intake, and abdominal bloating

## 2023-01-08 NOTE — DISCHARGE NOTE PROVIDER - NSDCCPTREATMENT_GEN_ALL_CORE_FT
PRINCIPAL PROCEDURE  Procedure: Stress test, cardiopulmonary, nuclear medicine  Findings and Treatment: PROCEDURE:  7.7 mCi of Tc 99m Tetrofosmin were injected during stress  protocol. Approximately 45 minutes later, tomographic  images were obtained in a 180 degree arc from right  anterior oblique to left anterior oblique with 64 stops.  At a separate time on 00/00/0000, 23.5 mCi of Tc 99m  Tetrofosmin were injected at rest. Approximately 45  minute(s) later, tomographic images were obtained in a 180  degree arc from right anterior oblique to left anterior  oblique with 64 stops. The tomographic slices were  reconstructed in 3 orthogonal planes (short axis,  horizontal long axis and vertical long axis).  Interpretation was performed both by visual and  quantitative analysis.  Rest and stress images were acquired using CZT-based  system with pinhole collimation ([employer] c, [employer]ni[employer]), and reconstructed using MLEM algorithm.  Images were re-acquired with the patient in a prone  position.  ------------------------------------------------------------------------  NUCLEAR FINDINGS:  There is a medium sized, moderate defect in inferior wall  that is fixed, suggestive of infarct.  ------------------------------------------------------------------------  GATED ANALYSIS:  Post-stress gated wall motion analysis was performed (LVEF  = 48 %;LVEDV = 101 ml.)  ------------------------------------------------------------------------  IMPRESSIONS:Abnormal Study  * There is a medium sized, moderate defect in inferior  wall that is fixed, suggestive of infarct.  * Post-stress gated wall motion analysis was performed  (LVEF = 48 %;LVEDV = 101 ml.)  * Consistent with no clear evidence of ischemia.

## 2023-01-08 NOTE — DISCHARGE NOTE PROVIDER - NSFOLLOWUPCLINICS_GEN_ALL_ED_FT
[facility]  Cardiology  [address] [place] [id number]  Phone: [phone]  Fax:

## 2023-01-08 NOTE — DISCHARGE NOTE PROVIDER - PROVIDER TOKENS
FREE:[LAST:[Your PCP],PHONE:[(   )    -],FAX:[(   )    -]] FREE:[LAST:[Your PCP],PHONE:[(   )    -],FAX:[(   )    -]],PROVIDER:[TOKEN:[[id number]]]

## 2023-01-08 NOTE — DISCHARGE NOTE PROVIDER - NSDCMRMEDTOKEN_GEN_ALL_CORE_FT
Multiple OTC medications: Takes ~20 OTC meds in AM and ~20 OTC meds in PM   aspirin 81 mg oral delayed release tablet: 1 tab(s) orally once a day   atorvastatin 20 mg oral tablet: 1 tab(s) orally once a day (at bedtime)   Metoprolol Tartrate 25 mg oral tablet: 1 tab(s) orally once a day   Multiple OTC medications: Takes ~20 OTC meds in AM and ~20 OTC meds in PM

## 2023-01-08 NOTE — DISCHARGE NOTE NURSING/CASE MANAGEMENT/SOCIAL WORK - PATIENT PORTAL LINK FT
You can access the FollowMyHealth Patient Portal offered by [facility] by registering at the following website: [email or web address]. By joining [employer]’s FollowMyHealth portal, you will also be able to view your health information using other applications (apps) compatible with our system.

## 2023-01-08 NOTE — PROGRESS NOTE ADULT - SUBJECTIVE AND OBJECTIVE BOX
no events overnight        acetaminophen     Tablet .. 650 milliGRAM(s) Oral every 6 hours PRN  acetaminophen     Tablet .. 975 milliGRAM(s) Oral every 6 hours PRN  aluminum hydroxide/magnesium hydroxide/simethicone Suspension 30 milliLiter(s) Oral every 4 hours PRN  enoxaparin Injectable 40 milliGRAM(s) SubCutaneous every 24 hours  influenza  Vaccine (HIGH DOSE) 0.7 milliLiter(s) IntraMuscular once  melatonin 3 milliGRAM(s) Oral at bedtime PRN                            15.2   6.85  )-----------( 196      ( 08 Jan 2023 06:40 )             45.5       Hemoglobin: 15.2 g/dL (01-08 @ 06:40)  Hemoglobin: 15.0 g/dL (01-07 @ 07:36)  Hemoglobin: 15.3 g/dL (01-06 @ 05:39)  Hemoglobin: 15.9 g/dL (01-05 @ 13:15)      01-08    138  |  105  |  15  ----------------------------<  91  3.6   |  22  |  1.06    Ca    9.2      08 Jan 2023 06:40  Phos  2.9     01-08  Mg     2.20     01-08      Creatinine Trend: 1.06<--, 1.04<--, 1.09<--, 1.15<--, 1.15<--    COAGS:           T(C): 36.4 (01-08-23 @ 06:18), Max: 37.1 (01-07-23 @ 20:16)  HR: 60 (01-08-23 @ 06:18) (60 - 71)  BP: 110/72 (01-08-23 @ 06:18) (110/72 - 124/68)  RR: 18 (01-08-23 @ 06:18) (17 - 18)  SpO2: 98% (01-08-23 @ 06:18) (97% - 98%)  Wt(kg): --    I&O's Summary        General: Well nourished in no acute distress. Alert and Oriented * 3.   Head: Normocephalic and atraumatic.   Neck: No JVD. No bruits. Supple. Does not appear to be enlarged.   Cardiovascular: + S1,S2 ; RRR Soft systolic murmur at the left lower sternal border. No rubs noted.    Lungs: CTA b/l. No rhonchi, rales or wheezes.   Abdomen: + BS, soft. Non tender. Non distended. No rebound. No guarding.   Extremities: No clubbing/cyanosis/edema.   Neurologic: Moves all four extremities. Full range of motion.   Skin: Warm and moist. The patient's skin has normal elasticity and good skin turgor.   Psychiatric: Appropriate mood and affect.  Musculoskeletal: Normal range of motion, normal strength       TELEMETRY:  Sinus rhythm/Sinus Owen    ECG:  	NSR    RADIOLOGY:  CXR:  The heart is normal in size.  Anatomic variant azygous fissure noted in right apical region. The lungs   are clear.  There is no pleural effusion.  There is no pneumothorax.  No acute bony abnormality.  Right upper quadrant clips.    IMPRESSION:  Clear lungs.     ECHO: < from: Transthoracic Echocardiogram (01.06.23 @ 14:55) >  CONCLUSIONS:  1. Normal left ventricular systolic function. No segmental  wall motion abnormalities.  2. Normal right ventricular size and function.  ------------------------------------------------------------------------  Confirmed on  1/6/2023 - 16:02:42 by Atilio Mosher M.D.  ------------------------------------------------------------------------    < end of copied text >    ASSESSMENT/PLAN: Pt is a 66 yo man, former heavy smoker, with history of cholecystectomy and inguinal hernia repair presents with b/l chest pain, decreased exercise tolerance, poor appetite/PO intake, and abdominal bloating for past 2 weeks.    1. B/L chest tightness, phlegm production/SOB  - atrypical, reports tightness with rest and exertion along with SOB  - EKG non-ischemic, chest xray clear, trop neagtive, BNP normal  - f/u Ct sccan  -  stress pending

## 2023-01-08 NOTE — DISCHARGE NOTE PROVIDER - NSDCCPCAREPLAN_GEN_ALL_CORE_FT
PRINCIPAL DISCHARGE DIAGNOSIS  Diagnosis: Chest pain  Assessment and Plan of Treatment: During your stay you had an EKG and troponins measured which were normal. You had a chest ct which did not show any acute pulmonary pathology. It was reccomended that you have a cath procedure but you left against medical advice.      SECONDARY DISCHARGE DIAGNOSES  Diagnosis: Abdominal bloating  Assessment and Plan of Treatment: During your stay you were evaluated for abdominal bloating and weight loss. You had a chest and abdominal CT which was did not show malignancy or any acute pathology. Please follow up with your PCP in 1 week for further workup.    Diagnosis: Abnormal stress test  Assessment and Plan of Treatment: During your stay you had a stress test which was abnormal. It was reccomended that you have a cath procedure to evalute for coronary artery disease but you declined and left against medical advice. Please follow up with cardiology ASAP outpatient for cath procedure. We sent aspirin, atorvastatin, and metoprolol to help protect against advanced disease.

## 2023-01-08 NOTE — DISCHARGE NOTE PROVIDER - CARE PROVIDER_API CALL
Your PCP,   Phone: (   )    -  Fax: (   )    -  Follow Up Time:    Your PCP,   Phone: (   )    -  Fax: (   )    -  Follow Up Time:     Saud Zavala)  Cardiovascular Disease  [address] [address]  Phone: [phone]  Fax: [phone]  Follow Up Time:

## 2023-01-08 NOTE — DISCHARGE NOTE PROVIDER - HOSPITAL COURSE
68 yo man, former heavy smoker, with history of cholecystectomy and inguinal hernia repair presents with b/l chest pain, decreased exercise tolerance, poor appetite/PO intake, and abdominal bloating for past 2 weeks, admitted for further workup.    Chest pain  - 2 weeks of b/l chest pain, localized to his lower anterior ribs, described as a tightness/squeezing sensation, preceded by a frequent cough productive of white sputum, and associated with decreased exercise tolerance and dyspnea on exertion.   - cardiology consulted   - Hs-trops 8 -> 8. Initial and repeat EKGs with no acute ischemic changes.   - Pro-BNP 44. CXR with clear lungs. Pt euvolemic on exam.  - echo EF: 55%, normal LVF  - CT chest & abdomen w/o contrast: No evidence of suspicious mass or lymphadenopathy in the chest, abdomen,   or pelvis.  - NST - p    Weight loss w/ abdominal bloating  - Pt with ~10-lb weight loss over past 2 weeks in setting of poor appetite and PO intake. Possibly in setting of recent infection given frequent productive cough, though can be due to malignancy.  - CT chest and CTAP noncontrast without lymphadenopathy or appreciable masses   - TSH, Lipase, LFTs wnl    Elevated lactic acid level.   - Initial VBG with lactate 7.1 and pH 7.24. HCO3 on chemistry 27. Repeat VBG x2 with lactate wnl and pH 7.44 without any IVF. Suspect elevated lactate a lab error. Pt never hypoxic or hypotensive on admission.     Case discussed with Dr. George on ______. Reviewed discharge medications with patient; All new medications requiring new prescription sent to pharmacy of patients choice. Reviewed need for prescription for previous home medication and new prescriptions sent if requested. Patient in agreement and understands.   68 yo man, former heavy smoker, with history of cholecystectomy and inguinal hernia repair presents with b/l chest pain, decreased exercise tolerance, poor appetite/PO intake, and abdominal bloating for past 2 weeks, admitted for further workup.    Chest pain  - 2 weeks of b/l chest pain, localized to his lower anterior ribs, described as a tightness/squeezing sensation, preceded by a frequent cough productive of white sputum, and associated with decreased exercise tolerance and dyspnea on exertion.   - cardiology consulted   - Hs-trops 8 -> 8. Initial and repeat EKGs with no acute ischemic changes.   - Pro-BNP 44. CXR with clear lungs. Pt euvolemic on exam.  - echo EF: 55%, normal LVF  - CT chest & abdomen w/o contrast: No evidence of suspicious mass or lymphadenopathy in the chest, abdomen,   or pelvis.  - NST with There is a medium sized, moderate defect in inferior wall that is fixed, suggestive of infarct.  - it was reccomended that you have a cath but you left against medical advice  - please start a statin, BB and, and aspirin as written and follow up with cardiology outpatient     Weight loss w/ abdominal bloating  - Pt with ~10-lb weight loss over past 2 weeks in setting of poor appetite and PO intake. Possibly in setting of recent infection given frequent productive cough, though can be due to malignancy.  - CT chest and CTAP noncontrast without lymphadenopathy or appreciable masses   - TSH, Lipase, LFTs wnl    Elevated lactic acid level.   - Initial VBG with lactate 7.1 and pH 7.24. HCO3 on chemistry 27. Repeat VBG x2 with lactate wnl and pH 7.44 without any IVF. Suspect elevated lactate a lab error. Pt never hypoxic or hypotensive on admission.     Patient wishes to leave against medical advice and was deemed as having capacity to make this decision. All risks were explained, as well as, the benefits of remaining admitted until medical team can properly discharge. The attending physician was made aware.

## 2023-01-08 NOTE — CHART NOTE - NSCHARTNOTEFT_GEN_A_CORE
Patient wishes to leave against medical advice and was deemed as having capacity to make this decision. All risks were explained, as well as, the benefits of remaining admitted until medical team can properly discharge. The attending physician was made aware.

## 2024-01-01 NOTE — PATIENT PROFILE ADULT - FUNCTIONAL ASSESSMENT - BASIC MOBILITY 2.
No care due was identified.  Health [facility] Embedded Care Due Messages. Reference number: [id number].   1/01/2024 8:58:25 AM CST   4 = No assist / stand by assistance

## 2024-01-16 ENCOUNTER — NON-APPOINTMENT (OUTPATIENT)
Age: 69
End: 2024-01-16